# Patient Record
Sex: FEMALE | URBAN - METROPOLITAN AREA
[De-identification: names, ages, dates, MRNs, and addresses within clinical notes are randomized per-mention and may not be internally consistent; named-entity substitution may affect disease eponyms.]

---

## 2018-02-07 ENCOUNTER — APPOINTMENT (RX ONLY)
Dept: URBAN - METROPOLITAN AREA CLINIC 4 | Facility: CLINIC | Age: 43
Setting detail: DERMATOLOGY
End: 2018-02-07

## 2018-02-07 DIAGNOSIS — L82.1 OTHER SEBORRHEIC KERATOSIS: ICD-10-CM

## 2018-02-07 DIAGNOSIS — L40.0 PSORIASIS VULGARIS: ICD-10-CM

## 2018-02-07 DIAGNOSIS — D18.0 HEMANGIOMA: ICD-10-CM

## 2018-02-07 DIAGNOSIS — L81.4 OTHER MELANIN HYPERPIGMENTATION: ICD-10-CM

## 2018-02-07 DIAGNOSIS — D22 MELANOCYTIC NEVI: ICD-10-CM

## 2018-02-07 DIAGNOSIS — Z71.89 OTHER SPECIFIED COUNSELING: ICD-10-CM

## 2018-02-07 PROBLEM — D18.01 HEMANGIOMA OF SKIN AND SUBCUTANEOUS TISSUE: Status: ACTIVE | Noted: 2018-02-07

## 2018-02-07 PROBLEM — I10 ESSENTIAL (PRIMARY) HYPERTENSION: Status: ACTIVE | Noted: 2018-02-07

## 2018-02-07 PROBLEM — D22.5 MELANOCYTIC NEVI OF TRUNK: Status: ACTIVE | Noted: 2018-02-07

## 2018-02-07 PROBLEM — D48.5 NEOPLASM OF UNCERTAIN BEHAVIOR OF SKIN: Status: ACTIVE | Noted: 2018-02-07

## 2018-02-07 PROBLEM — J45.909 UNSPECIFIED ASTHMA, UNCOMPLICATED: Status: ACTIVE | Noted: 2018-02-07

## 2018-02-07 PROCEDURE — ? BIOPSY BY SHAVE METHOD

## 2018-02-07 PROCEDURE — 11100: CPT

## 2018-02-07 PROCEDURE — 99203 OFFICE O/P NEW LOW 30 MIN: CPT | Mod: 25

## 2018-02-07 PROCEDURE — ? PRESCRIPTION

## 2018-02-07 PROCEDURE — 11101: CPT

## 2018-02-07 PROCEDURE — ? COUNSELING

## 2018-02-07 RX ORDER — FLUOCINONIDE 0.5 MG/ML
SOLUTION TOPICAL BID
Qty: 1 | Refills: 2 | Status: ERX | COMMUNITY
Start: 2018-02-07

## 2018-02-07 RX ADMIN — FLUOCINONIDE: 0.5 SOLUTION TOPICAL at 21:16

## 2018-02-07 ASSESSMENT — LOCATION SIMPLE DESCRIPTION DERM
LOCATION SIMPLE: LEFT CALF
LOCATION SIMPLE: LEFT UPPER BACK
LOCATION SIMPLE: RIGHT POPLITEAL SKIN
LOCATION SIMPLE: RIGHT POSTERIOR THIGH
LOCATION SIMPLE: RIGHT FOREARM
LOCATION SIMPLE: RIGHT EAR
LOCATION SIMPLE: CHEST
LOCATION SIMPLE: RIGHT FOREHEAD
LOCATION SIMPLE: RIGHT SCALP
LOCATION SIMPLE: LEFT LOWER BACK
LOCATION SIMPLE: LEFT FOREARM
LOCATION SIMPLE: LOWER BACK
LOCATION SIMPLE: LEFT EAR
LOCATION SIMPLE: ABDOMEN
LOCATION SIMPLE: LEFT POSTERIOR THIGH

## 2018-02-07 ASSESSMENT — LOCATION ZONE DERM
LOCATION ZONE: SCALP
LOCATION ZONE: LEG
LOCATION ZONE: FACE
LOCATION ZONE: EAR
LOCATION ZONE: TRUNK
LOCATION ZONE: ARM

## 2018-02-07 ASSESSMENT — LOCATION DETAILED DESCRIPTION DERM
LOCATION DETAILED: SUBXIPHOID
LOCATION DETAILED: SUPERIOR LUMBAR SPINE
LOCATION DETAILED: RIGHT DISTAL POSTERIOR THIGH
LOCATION DETAILED: PERIUMBILICAL SKIN
LOCATION DETAILED: EPIGASTRIC SKIN
LOCATION DETAILED: LEFT PROXIMAL CALF
LOCATION DETAILED: RIGHT INFERIOR MEDIAL FOREHEAD
LOCATION DETAILED: LEFT MEDIAL UPPER BACK
LOCATION DETAILED: RIGHT CAVUM CONCHA
LOCATION DETAILED: LEFT INFERIOR MEDIAL UPPER BACK
LOCATION DETAILED: MIDDLE STERNUM
LOCATION DETAILED: LEFT DISTAL POSTERIOR THIGH
LOCATION DETAILED: LEFT PROXIMAL DORSAL FOREARM
LOCATION DETAILED: LEFT SUPERIOR MEDIAL UPPER BACK
LOCATION DETAILED: RIGHT POPLITEAL SKIN
LOCATION DETAILED: LEFT INFERIOR LATERAL LOWER BACK
LOCATION DETAILED: RIGHT MEDIAL FRONTAL SCALP
LOCATION DETAILED: RIGHT PROXIMAL DORSAL FOREARM
LOCATION DETAILED: LEFT CAVUM CONCHA

## 2018-02-07 NOTE — HPI: RASH (PSORIASIS)
Do You Have A Family History Of Psoriasis?: no
How Severe Is Your Psoriasis?: mild
Is This A New Presentation, Or A Follow-Up?: Follow Up Psoriasis
Additional History: Needs a refill of fluocinonide solution.

## 2018-02-07 NOTE — PROCEDURE: BIOPSY BY SHAVE METHOD
X Size Of Lesion In Cm: 0
Anesthesia Type: 1% lidocaine with 1:100,000 epinephrine and 408mcg clindamycin/ml and a 1:10 solution of 8.4% sodium bicarbonate
Anesthesia Volume In Cc: 1
Type Of Destruction Used: Curettage
Billing Type: Third-Party Bill
Bill For Surgical Tray: no
Detail Level: Detailed
Lab: [id number]
Dressing: bandage
Consent: Written consent was obtained and risks were reviewed including but not limited to scarring, infection, bleeding, scabbing, incomplete removal, nerve damage and allergy to anesthesia.
Biopsy Method: Personna blade
Electrodesiccation And Curettage Text: The wound bed was treated with electrodesiccation and curettage after the biopsy was performed.
Wound Care: Vaseline
Electrodesiccation Text: The wound bed was treated with electrodesiccation after the biopsy was performed.
Render Post-Care Instructions In Note?: yes
Notification Instructions: Patient will be notified of biopsy results. However, patient instructed to call the office if not contacted within 2 weeks.
Biopsy Type: H and E
Hemostasis: Drysol
Cryotherapy Text: The wound bed was treated with cryotherapy after the biopsy was performed.
Silver Nitrate Text: The wound bed was treated with silver nitrate after the biopsy was performed.
Curettage Text: The wound bed was treated with curettage after the biopsy was performed.
Lab Facility: 
Post-Care Instructions: I reviewed with the patient in detail post-care instructions. Patient is to keep the biopsy site dry overnight, and then apply bacitracin twice daily until healed. Patient may apply hydrogen peroxide soaks to remove any crusting.

## 2018-05-23 ENCOUNTER — OFFICE VISIT (OUTPATIENT)
Dept: MEDICAL GROUP | Facility: MEDICAL CENTER | Age: 43
End: 2018-05-23
Payer: COMMERCIAL

## 2018-05-23 VITALS
HEIGHT: 65 IN | HEART RATE: 77 BPM | WEIGHT: 192.9 LBS | OXYGEN SATURATION: 94 % | DIASTOLIC BLOOD PRESSURE: 86 MMHG | BODY MASS INDEX: 32.14 KG/M2 | TEMPERATURE: 97.7 F | SYSTOLIC BLOOD PRESSURE: 138 MMHG

## 2018-05-23 DIAGNOSIS — J45.50 SEVERE PERSISTENT ASTHMA WITHOUT COMPLICATION: ICD-10-CM

## 2018-05-23 DIAGNOSIS — R03.0 ELEVATED BLOOD PRESSURE READING: ICD-10-CM

## 2018-05-23 DIAGNOSIS — T78.40XA ALLERGIC STATE, INITIAL ENCOUNTER: ICD-10-CM

## 2018-05-23 DIAGNOSIS — L40.9 PSORIASIS: ICD-10-CM

## 2018-05-23 DIAGNOSIS — Z00.00 PREVENTATIVE HEALTH CARE: ICD-10-CM

## 2018-05-23 PROCEDURE — 99204 OFFICE O/P NEW MOD 45 MIN: CPT | Performed by: PHYSICIAN ASSISTANT

## 2018-05-23 RX ORDER — ALBUTEROL SULFATE 90 UG/1
2 AEROSOL, METERED RESPIRATORY (INHALATION) EVERY 6 HOURS PRN
Qty: 3 INHALER | Refills: 3 | Status: SHIPPED | OUTPATIENT
Start: 2018-05-23 | End: 2019-06-03 | Stop reason: SDUPTHER

## 2018-05-23 RX ORDER — CETIRIZINE HYDROCHLORIDE 10 MG/1
10 TABLET ORAL DAILY
COMMUNITY

## 2018-05-23 RX ORDER — FLUOCINONIDE TOPICAL SOLUTION USP, 0.05% 0.5 MG/ML
SOLUTION TOPICAL 2 TIMES DAILY
COMMUNITY
End: 2018-05-23 | Stop reason: SDUPTHER

## 2018-05-23 RX ORDER — MONTELUKAST SODIUM 10 MG/1
10 TABLET ORAL DAILY
Qty: 90 TAB | Refills: 3 | Status: SHIPPED | OUTPATIENT
Start: 2018-05-23 | End: 2019-06-04 | Stop reason: SDUPTHER

## 2018-05-23 RX ORDER — FLUOCINONIDE TOPICAL SOLUTION USP, 0.05% 0.5 MG/ML
SOLUTION TOPICAL
Qty: 1 BOTTLE | Refills: 11 | Status: SHIPPED | OUTPATIENT
Start: 2018-05-23 | End: 2018-05-29 | Stop reason: SDUPTHER

## 2018-05-23 RX ORDER — ALBUTEROL SULFATE 90 UG/1
2 AEROSOL, METERED RESPIRATORY (INHALATION) EVERY 6 HOURS PRN
COMMUNITY
End: 2018-05-23 | Stop reason: SDUPTHER

## 2018-05-23 RX ORDER — TRIAMCINOLONE ACETONIDE 40 MG/ML
60 INJECTION, SUSPENSION INTRA-ARTICULAR; INTRAMUSCULAR ONCE
Status: COMPLETED | OUTPATIENT
Start: 2018-05-23 | End: 2018-05-23

## 2018-05-23 RX ADMIN — TRIAMCINOLONE ACETONIDE 60 MG: 40 INJECTION, SUSPENSION INTRA-ARTICULAR; INTRAMUSCULAR at 14:10

## 2018-05-23 ASSESSMENT — PATIENT HEALTH QUESTIONNAIRE - PHQ9: CLINICAL INTERPRETATION OF PHQ2 SCORE: 0

## 2018-05-23 NOTE — PROGRESS NOTES
Chief Complaint   Patient presents with   • Establish Care     General Check up   • Hypertension     Diagnosed with HTN   • Medication Refill     Ventolin, advair 550, psoriasis       Marcela Mercer is a 43 y.o. new female here for establishing care. Pt is a . States she has gained a lot of weight recently and she has been stressed on nonwork. She has been to take the next couple months off and go traveling across the [place]    HPI:    Severe persistent asthma without complication  Pt has asthma which is a new problem to me.Not controlled on current medicine she is on Advair daily and albuterol as needed. She uses albuterol 4 time a day and multiple times at night. Pt gets wheezing and chest tightness and cough which improves after inhaler.     Allergies  Patient has allergies that recently has been worsening change in season. She is taking Zyrtec daily without any help. Continues to have runny nose and that her symptoms. Patient used to get Gyne-Lotrimin in the past frequently.     Elevated blood pressure reading  Recently patient had one episode of elevated blood pressure reading at her GYN office. States her blood pressure was 134/101 without any SOB or CP or lower leg edema. Patient was advised to follow-up with her PCP.    Patient has psoriasis mainly affects her ears. She is due as Lidex in the past with good results.        Current medicines (including changes today)  Current Outpatient Prescriptions   Medication Sig Dispense Refill   • cetirizine (ZYRTEC) 10 MG Tab Take 10 mg by mouth every day.     • montelukast (SINGULAIR) 10 MG Tab Take 1 Tab by mouth every day. 90 Tab 3   • fluticasone-salmeterol (ADVAIR) 500-50 MCG/DOSE AEROSOL POWDER, BREATH ACTIVATED Inhale 1 Puff by mouth every 12 hours. 3 Inhaler 3   • albuterol 108 (90 Base) MCG/ACT Aero Soln inhalation aerosol Inhale 2 Puffs by mouth every 6 hours as needed for Shortness of Breath. 3 Inhaler 3   • fluocinonide (LIDEX) 0.05 % external  "solution Apply a pea size over affected area 1 Bottle 11     No current facility-administered medications for this visit.      She  has a past medical history of Allergy; Hypertension; and Psoriasis.  She  has a past surgical history that includes sinuscopy.  Social History   Substance Use Topics   • Smoking status: Never Smoker   • Smokeless tobacco: Never Used   • Alcohol use Yes      Comment: occ     Social History     Social History Narrative   • No narrative on file     History reviewed. No pertinent family history.  Family Status   Relation Status   • Mother Alive   • Father Other   • Sister Alive   • Brother Alive       Past medical, surgical, family, and social history is reviewed in [facility] chart by me today.   Medications and allergies reviewed in Epic chart by me today.       ROS  General:  No fevers or chills, no night sweats or fatigue.   Eyes: no change in vision.   ENT:         Ears: No drainage. No change in hearing      Nose :No epitaxis        Mouth/ throat: No lesions. No sore throat  GI: no nausea, no vomiting, no diarrhea or constipations. Bowel regular and normal. No melena or hematochezia. No hematemesis  : no Frequency, no urgency, no dysuria, no hematuria.   MS:  Negative for muscle pain or weakness.  Skin: no rashes or abnormal lesions.   Neuro: No seizures, no tingling or numbness.   Endo: no polyuria, no polydypsia, no cold intolerance, no heat intolerance  Psych: no depression, no anxiety, no Drug/Alcohol abuse  Hem: no easy bruising.    As documented in history of present illness  ROS neg:  All other review of systems were reviewed and negative.             Objective:     Blood pressure 138/86, pulse 77, temperature 36.5 °C (97.7 °F), height 1.651 m (5' 5\"), weight 87.5 kg (192 lb 14.4 oz), last menstrual period 05/10/2018, SpO2 94 %, not currently breastfeeding. Body mass index is 32.1 kg/m².  Physical Exam:    Constitutional: Well-developed and well-nourished. No distress.   Skin: Skin " is warm and dry. No rashes in visible areas.  Nail: w/o pitting, ridging or onychomycosis   Head: Atraumatic without lesions.  Eyes: Equal, round and reactive, conjunctiva clear,sclera non icteric, lids normal.  Ears:  External ears unremarkable. Tympanic membranes clear and intact.  Nose: Nares patent. Septum midline. Turbinates without erythema nor edema. No discharge.    Mouth/Throat: Dentition is good. Tongue normal. Oropharynx is clear and moist. Posterior pharynx without erythema or exudates.  Neck: Supple, trachea midline.  No thyromegaly present. No lymphadenopathy--cervical or supraclavicular  Cardiovascular: Regular rate and rhythm, without any murmurs.  No lower extremity edema. Cap refill < 3sec  Lung:  Clear to auscultation throughout w/o any wheeze or rhonchi. No adventitious sounds.    Abdomen: Soft, non tender, and without distention. Active bowel sounds in all four quadrants. No rebound, guarding, masses or HSM. No CVA tenderness  Musculoskeletal: All major joints without pain or swelling  Neurological: speech normal, mental status intact, gait grossly normal  Psychiatric:   Alert and oriented x3, normal affect and mood and behavior     Assessment and Plan:   The following treatment plan was discussed    1. Severe persistent asthma without complication    - montelukast (SINGULAIR) 10 MG Tab; Take 1 Tab by mouth every day.  Dispense: 90 Tab; Refill: 3  - fluticasone-salmeterol (ADVAIR) 500-50 MCG/DOSE AEROSOL POWDER, BREATH ACTIVATED; Inhale 1 Puff by mouth every 12 hours.  Dispense: 3 Inhaler; Refill: 3  - albuterol 108 (90 Base) MCG/ACT Aero Soln inhalation aerosol; Inhale 2 Puffs by mouth every 6 hours as needed for Shortness of Breath.  Dispense: 3 Inhaler; Refill: 3    2. Elevated blood pressure reading  Today in our office her BP is 138/86    3. Preventative health care    - COMP METABOLIC PANEL; Future  - CBC WITH DIFFERENTIAL; Future  - LIPID PROFILE; Future  - TSH WITH REFLEX TO FT4;  Future  - VITAMIN D,25 HYDROXY; Future    4. BMI 32.0-32.9,adult  - Patient identified as having weight management issue.  Appropriate orders and counseling given.      5. Psoriasis    - fluocinonide (LIDEX) 0.05 % external solution; Apply a pea size over affected area  Dispense: 1 Bottle; Refill: 11    6. Allergic state, initial encounter    - triamcinolone acetonide (KENALOG-40) injection 60 mg; 1.5 mL by Intramuscular route Once.    Pap up to date, gets it at gyn office      Followup: Return in about 2 months (around 7/23/2018).         Please note that this dictation was created using voice recognition software. I have made every reasonable attempt to correct obvious errors, but I expect that there are errors of grammar and possibly content that I did not discover before finalizing the note

## 2018-05-23 NOTE — ASSESSMENT & PLAN NOTE
Uses albuterol 4 time a day and multiple times at night. Pt gets wheezing and chest tightness and cough which improves after inhaler.

## 2018-05-23 NOTE — LETTER
Critical access hospital  Tiffany Robles P.A.-C.  [address]  [place] [address]  Fax: [phone]   Authorization for Release/Disclosure of   Protected Health Information   Name: MARCELA BELLO : 1975 SSN: [id number]   Address: [address] [place] [address] [address]  [place] [address] Phone:    [phone] (home)    I authorize the entity listed below to release/disclose the PHI below to:   Critical access hospital/Tiffany Robles P.A.-C. and Tiffany Robles P.A.-C.   Provider or Entity Name: HARDY Zamudio     Address   City, [facility]   Phone:      Fax:: [phone]     Reason for request: continuity of care   Information to be released:    [  ] LAST COLONOSCOPY,  including any PATH REPORT and follow-up  [  ] LAST FIT/COLOGUARD RESULT [  ] LAST DEXA  [ xxx ] LAST MAMMOGRAM  [ xxx ] LAST PAP  [  ] LAST LABS [  ] RETINA EXAM REPORT  [  ] IMMUNIZATION RECORDS  [  ] Release all info      [  ] Check here and initial the line next to each item to release ALL health information INCLUDING  _____ Care and treatment for drug and / or alcohol abuse  _____ HIV testing, infection status, or AIDS  _____ Genetic Testing    DATES OF SERVICE OR TIME PERIOD TO BE DISCLOSED: _____________  I understand and acknowledge that:  * This Authorization may be revoked at any time by you in writing, except if your health information has already been used or disclosed.  * Your health information that will be used or disclosed as a result of you signing this authorization could be re-disclosed by the recipient. If this occurs, your re-disclosed health information may no longer be protected by State or Federal laws.  * You may refuse to sign this Authorization. Your refusal will not affect your ability to obtain treatment.  * This Authorization becomes effective upon signing and will  on (date) __________.      If no date is indicated, this Authorization will  one (1) year from the signature date.    Name: Marcela LANDRY  Money    Signature:   Date:     5/23/2018       PLEASE FAX REQUESTED RECORDS BACK TO: [phone]

## 2018-05-25 ENCOUNTER — HOSPITAL ENCOUNTER (EMERGENCY)
Facility: MEDICAL CENTER | Age: 43
End: 2018-05-25
Payer: COMMERCIAL

## 2018-05-25 VITALS
BODY MASS INDEX: 32.54 KG/M2 | RESPIRATION RATE: 18 BRPM | HEART RATE: 108 BPM | SYSTOLIC BLOOD PRESSURE: 134 MMHG | TEMPERATURE: 97.1 F | OXYGEN SATURATION: 94 % | DIASTOLIC BLOOD PRESSURE: 95 MMHG | WEIGHT: 195.55 LBS

## 2018-05-25 LAB — GLUCOSE BLD-MCNC: 91 MG/DL (ref 65–99)

## 2018-05-25 PROCEDURE — 82962 GLUCOSE BLOOD TEST: CPT

## 2018-05-25 PROCEDURE — 302449 STATCHG TRIAGE ONLY (STATISTIC)

## 2018-05-26 NOTE — ED TRIAGE NOTES
"Marcela LANDRY Money  43 y.o. female  Chief Complaint   Patient presents with   • Unresponsive     Pt's family reports, \"She was unresponsive at home. The ambulance came. She started to come around when they came.\"        Pt triage via wheelchair for above complaint. Pt up to scale with steady gait. Urine noted in wheelchair. Pt reports having an IUD placed earlier today.   FSBS 91 and POC breathalyzer 0.283 in triage.  Pt is alert and oriented, speaking in full sentences with slurred speech, follows commands and responds appropriately to questions after some prompts. NAD. Resp are even and unlabored.  Pt placed in lobby. Pt educated on triage process. Pt encouraged to alert staff for any changes.    "

## 2018-05-29 DIAGNOSIS — L40.9 PSORIASIS: ICD-10-CM

## 2018-05-29 RX ORDER — FLUOCINONIDE TOPICAL SOLUTION USP, 0.05% 0.5 MG/ML
SOLUTION TOPICAL
Qty: 1 BOTTLE | Refills: 11 | Status: SHIPPED | OUTPATIENT
Start: 2018-05-29 | End: 2020-10-27 | Stop reason: SDUPTHER

## 2019-03-19 ENCOUNTER — OFFICE VISIT (OUTPATIENT)
Dept: MEDICAL GROUP | Facility: MEDICAL CENTER | Age: 44
End: 2019-03-19
Payer: COMMERCIAL

## 2019-03-19 VITALS
OXYGEN SATURATION: 97 % | TEMPERATURE: 98 F | HEIGHT: 65 IN | SYSTOLIC BLOOD PRESSURE: 132 MMHG | DIASTOLIC BLOOD PRESSURE: 82 MMHG | HEART RATE: 118 BPM | BODY MASS INDEX: 32.65 KG/M2 | RESPIRATION RATE: 20 BRPM | WEIGHT: 195.99 LBS

## 2019-03-19 DIAGNOSIS — J45.40 MODERATE PERSISTENT ASTHMA WITHOUT COMPLICATION: ICD-10-CM

## 2019-03-19 PROCEDURE — 99214 OFFICE O/P EST MOD 30 MIN: CPT | Performed by: PHYSICIAN ASSISTANT

## 2019-03-19 RX ORDER — PREDNISONE 20 MG/1
TABLET ORAL
Qty: 11 TAB | Refills: 0 | Status: SHIPPED | OUTPATIENT
Start: 2019-03-19 | End: 2021-03-30

## 2019-03-19 ASSESSMENT — PATIENT HEALTH QUESTIONNAIRE - PHQ9: CLINICAL INTERPRETATION OF PHQ2 SCORE: 0

## 2019-03-19 NOTE — PROGRESS NOTES
"Chief Complaint   Patient presents with   • Cough   • Headache       HPI:  Marcela Mercer is a 44 y.o. female here for new onset cough and headache X 1 month.  Patient states that symptoms started with sore throat and nasal congestion about a month ago.  Sore throat has resolved however she continues to have a dry cough and sensation that she cannot have a deep breathe in with chest tightness.  Patient is asthmatic and takes Advair and montelukast daily.  She takes albuterol as needed however states in the past month she has been using her albuterol multiple times a day.  Albuterol makes her heart rate go up.  Patient has 2 cats at home which she is allergic to.  States she has allergies to pollens and many other things.        Past medical, surgical, family, and social history is reviewed and updated in Epic chart by me today.   Medications and allergies reviewed and updated in Epic chart by me today.       ROS:   As documented in history of present illness above      Exam:  Blood pressure 132/82, pulse (!) 118, temperature 36.7 °C (98 °F), temperature source Temporal, resp. rate 20, height 1.651 m (5' 5\"), weight 88.9 kg (195 lb 15.8 oz), last menstrual period 03/05/2019, SpO2 97 %, not currently breastfeeding.    Constitutional: Alert, no distress.  Skin: Warm, dry, no rashes in visible areas.  Eye:conjunctiva clear without injection, no discharge,  lids normal.  ENMT: Lips without lesions, oropharynx clear, no tonsilar exudates.   Neck: Trachea midline, no masses, no thyromegaly. No cervical or supraclavicular lymphadenopathy  Respiratory: Unlabored respiratory effort, lungs clear to auscultation, no wheezes, no ronchi.  Cardiovascular: Normal S1, S2, no murmur,   Psych: Alert and oriented x3, normal affect and mood.            A/P:      1. Moderate persistent asthma without complication  predniSONE (DELTASONE) 20 MG Tab     O2 sat of 97.  Patient is tachycardic, could be due to using albuterol.  Therefore " decided not to do DuoNeb treatment today in office.  Discussed with patient that she needs to avoid allergens including the cats if it is making her asthma worse.  I suspect this is an asthma exacerbation since patient is    Advised patient to follow-up in 7 days if symptoms are not improving.    No Follow-up on file.

## 2019-06-03 DIAGNOSIS — J45.50 SEVERE PERSISTENT ASTHMA WITHOUT COMPLICATION: ICD-10-CM

## 2019-06-04 DIAGNOSIS — J45.50 SEVERE PERSISTENT ASTHMA WITHOUT COMPLICATION: ICD-10-CM

## 2019-06-04 RX ORDER — ALBUTEROL SULFATE 90 UG/1
2 AEROSOL, METERED RESPIRATORY (INHALATION) EVERY 6 HOURS PRN
Qty: 54 G | Refills: 0 | Status: SHIPPED | OUTPATIENT
Start: 2019-06-04 | End: 2019-06-25 | Stop reason: SDUPTHER

## 2019-06-05 RX ORDER — MONTELUKAST SODIUM 10 MG/1
10 TABLET ORAL DAILY
Qty: 90 TAB | Refills: 3 | Status: SHIPPED | OUTPATIENT
Start: 2019-06-05 | End: 2019-07-15 | Stop reason: SDUPTHER

## 2019-06-24 ENCOUNTER — TELEPHONE (OUTPATIENT)
Dept: MEDICAL GROUP | Facility: MEDICAL CENTER | Age: 44
End: 2019-06-24

## 2019-06-24 NOTE — TELEPHONE ENCOUNTER
Pt. Called and left a message stating she tried to use the albuterol inhaler last night and she noticed it wasn't working. It turns out she noticed it wasn't producing anything.She then used an old inhaler she has used before that still has a little bit left in it. She spoke with the pharmacy and they will replace it. She wants to know if it is possible to prescribe a back up inhaler in case this happens again.     She states she hikes a lot and is worried about that. She had an asthma attack last week while hiking. She wants to know if she can get a back up inhaler incase something like this happens again.

## 2019-06-25 DIAGNOSIS — J45.50 SEVERE PERSISTENT ASTHMA WITHOUT COMPLICATION: ICD-10-CM

## 2019-06-25 RX ORDER — ALBUTEROL SULFATE 90 UG/1
2 AEROSOL, METERED RESPIRATORY (INHALATION) EVERY 6 HOURS PRN
Qty: 54 G | Refills: 1 | Status: SHIPPED | OUTPATIENT
Start: 2019-06-25 | End: 2019-07-15 | Stop reason: SDUPTHER

## 2019-06-26 NOTE — TELEPHONE ENCOUNTER
Phone Number Called: [phone]     Call outcome: left message for patient to call back regarding message below    Message: LVM for pt. Stating that Tiffany filled her prescription for another inhaler to her local pharmacy.number we have on file for pt. Is not current, tried calling it however it was not in service. Received this number from the pharmacy.

## 2019-07-15 DIAGNOSIS — J45.50 SEVERE PERSISTENT ASTHMA WITHOUT COMPLICATION: ICD-10-CM

## 2019-07-16 RX ORDER — ALBUTEROL SULFATE 90 UG/1
2 AEROSOL, METERED RESPIRATORY (INHALATION) EVERY 6 HOURS PRN
Qty: 54 G | Refills: 1 | Status: SHIPPED | OUTPATIENT
Start: 2019-07-16 | End: 2019-11-19 | Stop reason: SDUPTHER

## 2019-07-16 RX ORDER — MONTELUKAST SODIUM 10 MG/1
10 TABLET ORAL DAILY
Qty: 90 TAB | Refills: 3 | Status: SHIPPED | OUTPATIENT
Start: 2019-07-16 | End: 2019-11-19 | Stop reason: SDUPTHER

## 2019-09-12 DIAGNOSIS — J45.50 SEVERE PERSISTENT ASTHMA WITHOUT COMPLICATION: ICD-10-CM

## 2019-09-17 ENCOUNTER — OFFICE VISIT (OUTPATIENT)
Dept: MEDICAL GROUP | Facility: MEDICAL CENTER | Age: 44
End: 2019-09-17
Payer: COMMERCIAL

## 2019-09-17 VITALS
TEMPERATURE: 97.2 F | HEIGHT: 65 IN | RESPIRATION RATE: 20 BRPM | HEART RATE: 100 BPM | SYSTOLIC BLOOD PRESSURE: 160 MMHG | OXYGEN SATURATION: 97 % | DIASTOLIC BLOOD PRESSURE: 100 MMHG | WEIGHT: 189.38 LBS | BODY MASS INDEX: 31.55 KG/M2

## 2019-09-17 DIAGNOSIS — Z98.890 HISTORY OF SINUS SURGERY: ICD-10-CM

## 2019-09-17 DIAGNOSIS — L40.9 PSORIASIS: ICD-10-CM

## 2019-09-17 DIAGNOSIS — I10 ESSENTIAL HYPERTENSION: ICD-10-CM

## 2019-09-17 DIAGNOSIS — J45.50 SEVERE PERSISTENT ASTHMA WITHOUT COMPLICATION: ICD-10-CM

## 2019-09-17 DIAGNOSIS — Z00.00 PREVENTATIVE HEALTH CARE: ICD-10-CM

## 2019-09-17 PROCEDURE — 99214 OFFICE O/P EST MOD 30 MIN: CPT | Performed by: PHYSICIAN ASSISTANT

## 2019-09-17 RX ORDER — LOSARTAN POTASSIUM 25 MG/1
25 TABLET ORAL DAILY
Qty: 30 TAB | Refills: 1 | Status: SHIPPED | OUTPATIENT
Start: 2019-09-17 | End: 2019-10-09 | Stop reason: SDUPTHER

## 2019-09-17 RX ORDER — BETAMETHASONE DIPROPIONATE 0.05 %
OINTMENT (GRAM) TOPICAL
Qty: 1 TUBE | Refills: 5 | Status: SHIPPED | OUTPATIENT
Start: 2019-09-17

## 2019-09-17 SDOH — HEALTH STABILITY: MENTAL HEALTH: HOW OFTEN DO YOU HAVE A DRINK CONTAINING ALCOHOL?: MONTHLY OR LESS

## 2019-09-17 SDOH — HEALTH STABILITY: MENTAL HEALTH: HOW OFTEN DO YOU HAVE 6 OR MORE DRINKS ON ONE OCCASION?: NEVER

## 2019-09-17 SDOH — HEALTH STABILITY: MENTAL HEALTH: HOW MANY STANDARD DRINKS CONTAINING ALCOHOL DO YOU HAVE ON A TYPICAL DAY?: 1 OR 2

## 2019-09-17 NOTE — PROGRESS NOTES
"Chief Complaint   Patient presents with   • Asthma   • Other     patient wants to talk about health       HPI  Marcela Mercer is a 44 y.o. female here today for    Patient has asthma and is currently taking daily montelukast and Advair however still has wheezing and nighttime symptoms and cough.  States she uses her albuterol multiple times a day.  Patient has chronic sinus congestion with prior sinus surgery.  States she has had sinus polyps removed.  Patient has allergies and has been to allergies in the past.  She has tried allergy shots without any help.    Patient has elevated BP today in office 160/100.  Has no prior history of hypertension per..  States she has been very stressed out because of her daughter causing problems.  Denies any S OB or CP.  States when her BP is elevated she feels a tension kind of feeling in her head.  Does not have blood pressure cough but she checks it at the grocery store and it has been elevated 160/170, over 90 -100.    Patient has psoriasis and has tried Lidex that used to be helpful.  States recently she started getting more patches over lower extremity and abdomen.  Patches are small.      Past medical, surgical, family, and social history is reviewed in Epic chart by me today.   Medications and allergies reviewed and updated in Epic chart by me today.     ROS:   As documented in history of present illness above    Exam:  /100   Pulse 100   Temp 36.2 °C (97.2 °F) (Temporal)   Resp 20   Ht 1.651 m (5' 5\")   Wt 85.9 kg (189 lb 6 oz)   SpO2 97%   Constitutional: Alert, no distress, plus 3 vital signs  Skin: Multiple small scaly erythematous area over lower extremity   eye: Equal, round and reactive, conjunctiva clear  ENMT: Lips without lesions, good dentition, oropharynx clear    Neck: Trachea midline, no masses, no thyromegaly  Respiratory: Unlabored respiratory effort, lungs clear to auscultation, no wheezes, no rhonchi  Cardiovascular: RRR, no murmur, no lower " extremities edema,   Psych: Alert, pleasant, well-groomed, normal affect    A/P:  1. Essential hypertension  Starting patient on low-dose losartan for elevated /100.  Patient is 89 medicine therefore was started on a low dose.  - losartan (COZAAR) 25 MG Tab; Take 1 Tab by mouth every day.  Dispense: 30 Tab; Refill: 1    2. Severe persistent asthma without complication  Asthma symptoms not controlled, patient uses albuterol multiple times a day.  Uses montelukast and Advair daily.  Has nighttime symptoms including wheezing and cough almost every night.  - REFERRAL TO PULMONOLOGY    3. History of sinus surgery      4. Psoriasis  Declines dermatology referral, denies any joint pains.    - betamethasone dipropionate (DIPROLENE) 0.05 % Ointment; apply a pea size over affected area once daily as needed  Dispense: 1 Tube; Refill: 5    5. Preventative health care    - CBC WITH DIFFERENTIAL; Future  - Comp Metabolic Panel; Future  - Lipid Profile; Future  - TSH WITH REFLEX TO FT4; Future  - VITAMIN D,25 HYDROXY; Future      F/u in 2-3 wks for HTN, Lab results and psoriasis

## 2019-10-04 DIAGNOSIS — I10 ESSENTIAL HYPERTENSION: ICD-10-CM

## 2019-10-04 RX ORDER — LOSARTAN POTASSIUM 25 MG/1
25 TABLET ORAL DAILY
Qty: 30 TAB | Refills: 1 | OUTPATIENT
Start: 2019-10-04

## 2019-10-04 NOTE — TELEPHONE ENCOUNTER
Patient states she wasn't feeling better with just the 1 pill. Patient started to take 1 1/2 daily and is now out can we please change to 40 milligrams          Was the patient seen in the last year in this department? Yes    Does patient have an active prescription for medications requested? No     Received Request Via: Pharmacy

## 2019-10-08 ENCOUNTER — APPOINTMENT (OUTPATIENT)
Dept: MEDICAL GROUP | Facility: MEDICAL CENTER | Age: 44
End: 2019-10-08
Payer: COMMERCIAL

## 2019-10-08 DIAGNOSIS — I10 ESSENTIAL HYPERTENSION: ICD-10-CM

## 2019-10-08 RX ORDER — LOSARTAN POTASSIUM 25 MG/1
25 TABLET ORAL DAILY
Qty: 30 TAB | Refills: 1 | Status: CANCELLED | OUTPATIENT
Start: 2019-10-08

## 2019-10-08 NOTE — TELEPHONE ENCOUNTER
Patient had appointment today 10/08 but had to be rescheduled due to provider leaving. She states she is out of meds.    Was the patient seen in the last year in this department? Yes    Does patient have an active prescription for medications requested? No     Received Request Via: Patient

## 2019-10-09 RX ORDER — LOSARTAN POTASSIUM 25 MG/1
37.5 TABLET ORAL DAILY
Qty: 30 TAB | Refills: 1 | Status: SHIPPED | OUTPATIENT
Start: 2019-10-09 | End: 2021-10-12

## 2019-10-09 NOTE — TELEPHONE ENCOUNTER
Patient was supposed to see Tiffany yesterday. We had to reschedule do to an emergency. She needs to up her dose that she has been taking which is 1 1/2 pills a day. Patient feels better. She does have an appointment next week. Can you help me ? Please advise

## 2019-10-23 ENCOUNTER — APPOINTMENT (OUTPATIENT)
Dept: MEDICAL GROUP | Facility: MEDICAL CENTER | Age: 44
End: 2019-10-23
Payer: COMMERCIAL

## 2019-11-06 DIAGNOSIS — J45.50 SEVERE PERSISTENT ASTHMA WITHOUT COMPLICATION: ICD-10-CM

## 2019-11-13 ENCOUNTER — APPOINTMENT (OUTPATIENT)
Dept: MEDICAL GROUP | Facility: MEDICAL CENTER | Age: 44
End: 2019-11-13
Payer: COMMERCIAL

## 2019-11-17 DIAGNOSIS — J45.50 SEVERE PERSISTENT ASTHMA WITHOUT COMPLICATION: ICD-10-CM

## 2019-11-19 ENCOUNTER — OFFICE VISIT (OUTPATIENT)
Dept: MEDICAL GROUP | Facility: MEDICAL CENTER | Age: 44
End: 2019-11-19
Payer: COMMERCIAL

## 2019-11-19 VITALS
BODY MASS INDEX: 33.24 KG/M2 | WEIGHT: 199.52 LBS | DIASTOLIC BLOOD PRESSURE: 82 MMHG | SYSTOLIC BLOOD PRESSURE: 128 MMHG | OXYGEN SATURATION: 98 % | HEIGHT: 65 IN | TEMPERATURE: 97.2 F | HEART RATE: 84 BPM | RESPIRATION RATE: 18 BRPM

## 2019-11-19 DIAGNOSIS — J45.50 SEVERE PERSISTENT ASTHMA WITHOUT COMPLICATION: ICD-10-CM

## 2019-11-19 DIAGNOSIS — J39.2 LESION OF PHARYNX: ICD-10-CM

## 2019-11-19 DIAGNOSIS — I10 ESSENTIAL HYPERTENSION: ICD-10-CM

## 2019-11-19 PROCEDURE — 99214 OFFICE O/P EST MOD 30 MIN: CPT | Performed by: PHYSICIAN ASSISTANT

## 2019-11-19 RX ORDER — MONTELUKAST SODIUM 10 MG/1
10 TABLET ORAL DAILY
Qty: 90 TAB | Refills: 3 | Status: SHIPPED | OUTPATIENT
Start: 2019-11-19 | End: 2021-02-17

## 2019-11-19 RX ORDER — LOSARTAN POTASSIUM 50 MG/1
50 TABLET ORAL DAILY
Qty: 90 TAB | Refills: 1 | Status: SHIPPED | OUTPATIENT
Start: 2019-11-19 | End: 2020-03-17 | Stop reason: SDUPTHER

## 2019-11-19 RX ORDER — MONTELUKAST SODIUM 10 MG/1
TABLET ORAL
Qty: 90 TAB | Refills: 0 | Status: SHIPPED | OUTPATIENT
Start: 2019-11-19 | End: 2020-02-25

## 2019-11-19 RX ORDER — ALBUTEROL SULFATE 90 UG/1
2 AEROSOL, METERED RESPIRATORY (INHALATION) EVERY 6 HOURS PRN
Qty: 54 G | Refills: 1 | Status: SHIPPED | OUTPATIENT
Start: 2019-11-19 | End: 2020-01-21

## 2019-11-19 NOTE — PROGRESS NOTES
"Chief Complaint   Patient presents with   • Other     patient wants her BP to be checked   • Other     patient wants to discuss her medications       HPI  Marcela Mercer is a 44 y.o. female here today for:    Patient was a started on losartan 25 mg daily for hypertension.  Did not have any side effects however states 25 was not enough to control her BP, numbers 140/100.  Currently she is taking 1-1/2 pill, good BP, 128/82 today in our office.  Also her heart rate has improved, today in office HR 84.  States she has cut back using albuterol for asthma.  Currently takes daily montelukast and fluticasone salmeterol.  States her symptoms are currently better.    Patient has had a lesion over right side of the uvula for many years.  Her dentist told her that it is gradually increasing in size.  No pain, does not cause dysphasia.    Past medical, surgical, family, and social history is reviewed in Epic chart by me today.   Medications and allergies reviewed and updated in Epic chart by me today.     ROS:   As documented in history of present illness above    Exam:  /82 (BP Location: Right arm, Patient Position: Sitting, BP Cuff Size: Adult)   Pulse 84   Temp 36.2 °C (97.2 °F) (Temporal)   Resp 18   Ht 1.651 m (5' 5\")   Wt 90.5 kg (199 lb 8.3 oz)   SpO2 98%   Constitutional: Alert, no distress, plus 3 vital signs  Eye: Equal, round and reactive, conjunctiva clear  ENMT: Lips without lesions, good dentition, small lesion over right side of the uvula over the pharynx with cauliflower appearance on top.    Neck: Trachea midline, no masses, no thyromegaly  Respiratory: Unlabored respiratory effort, lungs clear to auscultation, no wheezes, no rhonchi  Cardiovascular: RRR, no murmur,      Psych: Alert, pleasant, well-groomed, normal affect    A/P:  1. Essential hypertension  Increase losartan to 50 mg qd   - losartan (COZAAR) 50 MG Tab; Take 1 Tab by mouth every day.  Dispense: 90 Tab; Refill: 1    2. Severe " persistent asthma without complication    - montelukast (SINGULAIR) 10 MG Tab; Take 1 Tab by mouth every day.  Dispense: 90 Tab; Refill: 3  - albuterol 108 (90 Base) MCG/ACT Aero Soln inhalation aerosol; Inhale 2 Puffs by mouth every 6 hours as needed for Shortness of Breath.  Dispense: 54 g; Refill: 1    3. Lesion of pharynx    - REFERRAL TO ENT    F/u prn

## 2019-12-07 DIAGNOSIS — I10 ESSENTIAL HYPERTENSION: ICD-10-CM

## 2019-12-10 RX ORDER — LOSARTAN POTASSIUM 25 MG/1
TABLET ORAL
Qty: 30 TAB | Refills: 0 | Status: SHIPPED | OUTPATIENT
Start: 2019-12-10 | End: 2021-10-12

## 2020-01-20 DIAGNOSIS — J45.50 SEVERE PERSISTENT ASTHMA WITHOUT COMPLICATION: ICD-10-CM

## 2020-01-21 RX ORDER — ALBUTEROL SULFATE 90 UG/1
2 AEROSOL, METERED RESPIRATORY (INHALATION) EVERY 6 HOURS PRN
Qty: 8.5 G | Refills: 1 | Status: SHIPPED | OUTPATIENT
Start: 2020-01-21 | End: 2020-08-11 | Stop reason: SDUPTHER

## 2020-02-24 DIAGNOSIS — J45.50 SEVERE PERSISTENT ASTHMA WITHOUT COMPLICATION: ICD-10-CM

## 2020-02-25 RX ORDER — MONTELUKAST SODIUM 10 MG/1
TABLET ORAL
Qty: 90 TAB | Refills: 0 | Status: SHIPPED | OUTPATIENT
Start: 2020-02-25 | End: 2020-09-17

## 2020-03-17 DIAGNOSIS — I10 ESSENTIAL HYPERTENSION: ICD-10-CM

## 2020-03-17 RX ORDER — LOSARTAN POTASSIUM 50 MG/1
50 TABLET ORAL DAILY
Qty: 90 TAB | Refills: 1 | Status: SHIPPED | OUTPATIENT
Start: 2020-03-17 | End: 2020-11-18 | Stop reason: SDUPTHER

## 2020-03-17 NOTE — TELEPHONE ENCOUNTER
Patient states she has no more refills.     Received request via: Patient    Was the patient seen in the last year in this department? Yes    Does the patient have an active prescription (recently filled or refills available) for medication(s) requested? No

## 2020-04-21 NOTE — TELEPHONE ENCOUNTER
[address] [facility]  Dept: [phone]  Dept Fax: [phone]  Loc: [phone]    Manda Jara is a 76 y.o. female who presents today for her medical conditions/complaints as noted below. Manda Jara is c/o of   Chief Complaint   Patient presents with    Headache    Nausea    Depression       HPI:     HPI Yvette Mcneal was seen today for her regular weekly follow up while at [facility]. Since she was seen two weeks ago due to me being on vacation she was back in the hospital at [place] [address] to have her shunt readjusted. Since having it adjusted again she feels like things are going much better although she still doesn't feel great. Her headaches are gone but she has some back pain, she is tired and she is occasionally nauseated. She has not gotten to do much therapy yet because her neurosurgeon wants her to wait until her shunt is more stable. She is still feeling very depressed. She has no motivation to do anything and she is just generally feeling down. She is not sleeping very well. She is falling asleep more easily with the trazodone but she is still waking up around midnight and struggling to go back to sleep. Her appetite is decreased. She is feeling very irritable and her family has noticed that she is depressed.        Past Medical History:   Diagnosis Date    Chronic obstructive pulmonary disease ([address]     Coronary artery disease     Dyslipidemia     Obesity     Pneumonia           Social History     Tobacco Use    Smoking status: Current Every Day Smoker     Packs/day: 1.00     Years: 50.00     Pack years: 50.00     Start date: 1/1/1970    Smokeless tobacco: Never Used   Substance Use Topics    Alcohol use: No     Current Outpatient Medications   Medication Sig Dispense Refill    sertraline (ZOLOFT) 50 MG tablet Take 1 tablet by mouth daily 30 tablet 0    traZODone (DESYREL) 100 MG tablet Take 1 tablet by mouth Received request via: Pharmacy    Was the patient seen in the last year in this department? Yes    Does the patient have an active prescription (recently filled or refills available) for medication(s) requested? No   nightly 30 tablet 0    Misc. Devices (WALKER) MISC Standard walker with two wheels 1 each 0    butalbital-acetaminophen-caffeine (FIORICET, ESGIC) -40 MG per tablet Take 1 tablet by mouth every 4 hours as needed for Headaches 30 tablet 0    carvedilol (COREG) 3.125 MG tablet Take 1 tablet by mouth 2 times daily (with meals) 60 tablet 3    ondansetron (ZOFRAN ODT) 4 MG disintegrating tablet Take 1 tablet by mouth every 8 hours as needed for Nausea 20 tablet 0    ibuprofen (ADVIL;MOTRIN) 600 MG tablet Take 1 tablet by mouth every 8 hours as needed for Pain 20 tablet 0    aspirin 325 MG tablet Take 325 mg by mouth daily      ondansetron (ZOFRAN) 4 MG tablet Take 1 tablet by mouth every 8 hours as needed for Nausea 20 tablet 0    tiZANidine (ZANAFLEX) 2 MG tablet Take 1 tablet by mouth 3 times daily as needed (muscle spasm or headach) 30 tablet 0    fluticasone (FLONASE) 50 MCG/ACT nasal spray 1 spray by Nasal route daily 1 Bottle 0    Pseudoephedrine HCl (SUDAFED 12 HOUR PO) Take 1 tablet by mouth 2 times daily      albuterol sulfate  (90 Base) MCG/ACT inhaler Inhale 2 puffs into the lungs 4 times daily as needed for Wheezing 1 Inhaler 0    Pseudoephedrine-APAP-DM (DAYQUIL PO) Take by mouth      loratadine (CLARITIN) 10 MG tablet Take 10 mg by mouth daily       No current facility-administered medications for this visit. Allergies   Allergen Reactions    Vicodin [Hydrocodone-Acetaminophen]        Subjective:     Review of Systems   Constitutional: Positive for activity change, appetite change and fatigue. Negative for fever. Eyes: Negative for visual disturbance. Respiratory: Negative for cough, chest tightness, shortness of breath and wheezing. Cardiovascular: Negative for chest pain, palpitations and leg swelling. Gastrointestinal: Positive for nausea. Negative for abdominal pain, constipation and diarrhea.    Skin: Negative for rash and wound (surgical wounds are well healed). Neurological: Negative for dizziness, syncope and headaches (improving). Psychiatric/Behavioral: Positive for decreased concentration, dysphoric mood and sleep disturbance. Negative for hallucinations, self-injury and suicidal ideas. The patient is not nervous/anxious. Objective:      Physical Exam   Constitutional: She is oriented to person, place, and time. She appears well-developed and well-nourished. She appears ill. She appears distressed. She is laying on her side in bed during my exam with ice on her back   Neck: Normal range of motion. Neck supple. Cardiovascular: Normal rate, regular rhythm, normal heart sounds and intact distal pulses. No murmur heard. Pulmonary/Chest: Effort normal and breath sounds normal. No respiratory distress. She has no wheezes. Abdominal: Soft. Bowel sounds are normal. There is no tenderness. There is no rebound. Lymphadenopathy:     She has no cervical adenopathy. Neurological: She is alert and oriented to person, place, and time. Psychiatric: Her speech is normal. Judgment and thought content normal. Her mood appears not anxious. She is withdrawn. Cognition and memory are normal. She exhibits a depressed mood. She expresses no suicidal ideation. She expresses no suicidal plans and no homicidal plans. Nursing note and vitals reviewed. Assessment:       Diagnosis Orders   1. Chiari malformation type I ([address]     2. S/P  shunt     3. Moderate episode of recurrent major depressive disorder ([address]     4. Hydrocephalus, unspecified type               Plan:        Chiari malformation/s/p shunt: improving; she is doing better since her shunt was recently readjusted. Hopefully she can start doing therapy soon. Depression: new; Roscoe Muñiz was started on zoloft. I explained that the main side effect is GI distress. I will start with a low dose and titrate up as needed.  she was instructed that it can take 4-6weeks before the medication is working

## 2020-05-29 DIAGNOSIS — J45.50 SEVERE PERSISTENT ASTHMA WITHOUT COMPLICATION: ICD-10-CM

## 2020-08-11 ENCOUNTER — TELEPHONE (OUTPATIENT)
Dept: MEDICAL GROUP | Facility: PHYSICIAN GROUP | Age: 45
End: 2020-08-11

## 2020-08-11 DIAGNOSIS — J45.50 SEVERE PERSISTENT ASTHMA WITHOUT COMPLICATION: ICD-10-CM

## 2020-08-11 RX ORDER — ALBUTEROL SULFATE 90 UG/1
2 AEROSOL, METERED RESPIRATORY (INHALATION) EVERY 6 HOURS PRN
Qty: 8.5 G | Refills: 11 | Status: SHIPPED | OUTPATIENT
Start: 2020-08-11 | End: 2021-05-25

## 2020-09-17 DIAGNOSIS — J45.50 SEVERE PERSISTENT ASTHMA WITHOUT COMPLICATION: ICD-10-CM

## 2020-09-18 RX ORDER — MONTELUKAST SODIUM 10 MG/1
TABLET ORAL
Qty: 90 TAB | Refills: 0 | Status: SHIPPED | OUTPATIENT
Start: 2020-09-18 | End: 2020-10-27 | Stop reason: SDUPTHER

## 2020-10-27 ENCOUNTER — PATIENT MESSAGE (OUTPATIENT)
Dept: MEDICAL GROUP | Facility: MEDICAL CENTER | Age: 45
End: 2020-10-27

## 2020-10-27 DIAGNOSIS — J39.2 LESION OF PHARYNX: ICD-10-CM

## 2020-10-27 DIAGNOSIS — J45.50 SEVERE PERSISTENT ASTHMA WITHOUT COMPLICATION: ICD-10-CM

## 2020-10-27 DIAGNOSIS — L40.9 PSORIASIS: ICD-10-CM

## 2020-10-27 NOTE — PATIENT COMMUNICATION
1. Caller Name: Marcela Mercer                          Call Back Number: There are no phone numbers on file.        How would the patient prefer to be contacted with a response: Hire An [employer] message    2. SPECIFIC Action To Be Taken: Referral pending, please sign.    3. Diagnosis/Clinical Reason for Request: Lesion of pharynx J39.2    4. Specialty & Provider Name/Lab/Imaging Location: ENT    5. Is appointment scheduled for requested order/referral: no    Does pt need appt? Please advise

## 2020-10-27 NOTE — TELEPHONE ENCOUNTER
From: Marcela Mercer  To: Tiffany Robles P.A.-C.  Sent: 10/27/2020 12:32 PM PDT  Subject: Non-Urgent Medical Question    Good afternoon Tiffany Robles P.A.-C! Last year you put in a referral to the ENT, however I never found the opportunity to go. I do have new insurance. Please see attached. I am wondering if you could re-submit the referral? I still have the growth in the back of my throat and I think that the polyps in my sinuses have grown. My last sinus surgery to remove the polyps was about 5 years ago, I have had 4 surgeries over the years. It is becoming increasingly more difficult to breath and I have no sense of smell. Thank you so much!  [facility]  [address] [place] [address]. [id number]  Phone: [phone]  Fax: [phone]

## 2020-10-28 RX ORDER — MONTELUKAST SODIUM 10 MG/1
10 TABLET ORAL
Qty: 90 TAB | Refills: 0 | Status: SHIPPED | OUTPATIENT
Start: 2020-10-28 | End: 2021-01-29

## 2020-10-28 RX ORDER — FLUOCINONIDE TOPICAL SOLUTION USP, 0.05% 0.5 MG/ML
SOLUTION TOPICAL
Qty: 1 ML | Refills: 5 | Status: SHIPPED | OUTPATIENT
Start: 2020-10-28 | End: 2021-09-15 | Stop reason: SDUPTHER

## 2020-11-03 DIAGNOSIS — Z00.00 PREVENTATIVE HEALTH CARE: ICD-10-CM

## 2020-11-18 DIAGNOSIS — I10 ESSENTIAL HYPERTENSION: ICD-10-CM

## 2020-11-18 RX ORDER — LOSARTAN POTASSIUM 50 MG/1
50 TABLET ORAL DAILY
Qty: 90 TAB | Refills: 1 | Status: SHIPPED | OUTPATIENT
Start: 2020-11-18 | End: 2021-05-25

## 2021-01-29 DIAGNOSIS — J45.50 SEVERE PERSISTENT ASTHMA WITHOUT COMPLICATION: ICD-10-CM

## 2021-01-29 RX ORDER — MONTELUKAST SODIUM 10 MG/1
TABLET ORAL
Qty: 90 TAB | Refills: 0 | Status: SHIPPED | OUTPATIENT
Start: 2021-01-29

## 2021-01-29 NOTE — TELEPHONE ENCOUNTER
Received request via: Pharmacy    Was the patient seen in the last year in this department? No     Does the patient have an active prescription (recently filled or refills available) for medication(s) requested? No    lvm for pt to schedule appt as it has been over a year

## 2021-02-17 DIAGNOSIS — J45.50 SEVERE PERSISTENT ASTHMA WITHOUT COMPLICATION: ICD-10-CM

## 2021-02-17 RX ORDER — MONTELUKAST SODIUM 10 MG/1
TABLET ORAL
Qty: 90 TABLET | Refills: 3 | Status: SHIPPED | OUTPATIENT
Start: 2021-02-17 | End: 2022-05-10

## 2021-03-30 ENCOUNTER — TELEMEDICINE (OUTPATIENT)
Dept: MEDICAL GROUP | Facility: MEDICAL CENTER | Age: 46
End: 2021-03-30
Payer: COMMERCIAL

## 2021-03-30 VITALS — HEIGHT: 65 IN | WEIGHT: 205 LBS | BODY MASS INDEX: 34.16 KG/M2 | TEMPERATURE: 98.2 F

## 2021-03-30 DIAGNOSIS — J45.50 SEVERE PERSISTENT ASTHMA WITHOUT COMPLICATION: ICD-10-CM

## 2021-03-30 DIAGNOSIS — J33.9 NASAL POLYP: ICD-10-CM

## 2021-03-30 DIAGNOSIS — J45.901 PERSISTENT ASTHMA WITH ACUTE EXACERBATION, UNSPECIFIED ASTHMA SEVERITY: ICD-10-CM

## 2021-03-30 DIAGNOSIS — R05.9 COUGH: ICD-10-CM

## 2021-03-30 PROCEDURE — 99213 OFFICE O/P EST LOW 20 MIN: CPT | Mod: 95,CR | Performed by: PHYSICIAN ASSISTANT

## 2021-03-30 RX ORDER — METHYLPREDNISOLONE 4 MG/1
TABLET ORAL
Qty: 21 TABLET | Refills: 0 | Status: SHIPPED | OUTPATIENT
Start: 2021-03-30 | End: 2021-10-12

## 2021-03-30 RX ORDER — FLUTICASONE PROPIONATE 93 UG/1
SPRAY, METERED NASAL
COMMUNITY

## 2021-03-30 RX ORDER — BENZONATATE 100 MG/1
100 CAPSULE ORAL 3 TIMES DAILY PRN
Qty: 30 CAPSULE | Refills: 0 | Status: SHIPPED | OUTPATIENT
Start: 2021-03-30 | End: 2021-10-12

## 2021-03-30 ASSESSMENT — PATIENT HEALTH QUESTIONNAIRE - PHQ9: CLINICAL INTERPRETATION OF PHQ2 SCORE: 0

## 2021-03-30 NOTE — PROGRESS NOTES
"This visit was conducted utilizing secure and encrypted videoconferencing equipment, with the patient's consent.    Patient's identity was verified.          Chief Complaint   Patient presents with   • Medication Refill       HPI:     Marcela Mercer is a 46 y.o. female. Patient is presenting to discuss: Cough and asthma exacerbation.    Patient was previously on Advair daily and albuterol as needed.  States she would use albuterol about 4 times a day.  Her symptoms has been worse since she has been out of Advair for over a week now.  Uses albuterol more frequently daily.  Positive shortness of breath and cough.  Also today patient started having congestion and cold-like symptoms.  Feels like she is getting a cold.  No fevers no chills no change in the smell.     Patient is under care of ENT, was told she has nasal polyps and needs another surgery.        Past medical, surgical, family, and social history is reviewed in Epic chart by me today.   Medications and allergies reviewed and updated in Epic chart by me today.          Objective:     Temp 36.8 °C (98.2 °F) (Temporal)   Ht 1.651 m (5' 5\")   Wt 93 kg (205 lb)  Body mass index is 34.11 kg/m².   Physical Exam:  Pain: sounds comfortable   Constitutional: Alert, no distress.  Eye: pupils Equal, conjunctiva clear,   Respiratory: Unlabored respiratory effort, speaking in full sentences, no audible wheezes.           Assessment and Plan:   The following treatment plan was discussed      1. Nasal polyp      2. Persistent asthma with acute exacerbation, unspecified asthma severity    - methylPREDNISolone (MEDROL DOSEPAK) 4 MG Tablet Therapy Pack; As directed on the packaging label.  Dispense: 21 tablet; Refill: 0  - fluticasone-salmeterol (ADVAIR) 250-50 MCG/DOSE AEROSOL POWDER, BREATH ACTIVATED; Inhale 1 Puff every 12 hours.  Dispense: 60 Each; Refill: 11    3. Severe persistent asthma without complication  Discussed with patient that her asthma symptoms is not " controlled if she has to use albuterol 4 times a day with daily Advair and montelukast. (Symptoms are worse now due to cold and no Advair)     - fluticasone-salmeterol (ADVAIR) 250-50 MCG/DOSE AEROSOL POWDER, BREATH ACTIVATED; Inhale 1 Puff every 12 hours.  Dispense: 60 Each; Refill: 11  - REFERRAL TO PULMONARY AND SLEEP MEDICINE    4. Cough    - benzonatate (TESSALON) 100 MG Cap; Take 1 capsule by mouth 3 times a day as needed for Cough.  Dispense: 30 capsule; Refill: 0        F/U advised patient to follow-up if symptoms fail to improve in 7 days, sooner if worsen

## 2021-05-23 DIAGNOSIS — I10 ESSENTIAL HYPERTENSION: ICD-10-CM

## 2021-05-23 DIAGNOSIS — J45.50 SEVERE PERSISTENT ASTHMA WITHOUT COMPLICATION: ICD-10-CM

## 2021-05-25 RX ORDER — ALBUTEROL SULFATE 90 UG/1
2 AEROSOL, METERED RESPIRATORY (INHALATION) EVERY 6 HOURS PRN
Qty: 8.5 G | Refills: 11 | Status: SHIPPED | OUTPATIENT
Start: 2021-05-25

## 2021-05-25 RX ORDER — LOSARTAN POTASSIUM 50 MG/1
TABLET ORAL
Qty: 90 TABLET | Refills: 1 | Status: SHIPPED | OUTPATIENT
Start: 2021-05-25 | End: 2021-07-27

## 2021-06-16 ENCOUNTER — APPOINTMENT (OUTPATIENT)
Dept: SLEEP MEDICINE | Facility: MEDICAL CENTER | Age: 46
End: 2021-06-16
Payer: COMMERCIAL

## 2021-07-24 DIAGNOSIS — I10 ESSENTIAL HYPERTENSION: ICD-10-CM

## 2021-07-27 RX ORDER — LOSARTAN POTASSIUM 50 MG/1
TABLET ORAL
Qty: 90 TABLET | Refills: 1 | Status: SHIPPED | OUTPATIENT
Start: 2021-07-27 | End: 2021-10-12

## 2021-09-15 ENCOUNTER — PATIENT MESSAGE (OUTPATIENT)
Dept: MEDICAL GROUP | Facility: MEDICAL CENTER | Age: 46
End: 2021-09-15

## 2021-09-15 DIAGNOSIS — L40.9 PSORIASIS: ICD-10-CM

## 2021-09-15 RX ORDER — FLUOCINONIDE TOPICAL SOLUTION USP, 0.05% 0.5 MG/ML
SOLUTION TOPICAL
Qty: 1 ML | Refills: 5 | Status: SHIPPED | OUTPATIENT
Start: 2021-09-15

## 2021-09-15 NOTE — TELEPHONE ENCOUNTER
From: Marcela Mercer  To: Physician Assistant Tiffany Robles  Sent: 9/15/2021 9:27 AM PDT  Subject: perscription for fluocinonide    Good morning! I was wondering if you could please call in a prescription for the oil based fluocinonide? I would love it if you could send that to the [facility] in [facility]. Thank you.

## 2021-10-11 ENCOUNTER — HOSPITAL ENCOUNTER (EMERGENCY)
Facility: MEDICAL CENTER | Age: 46
End: 2021-10-12
Attending: EMERGENCY MEDICINE
Payer: COMMERCIAL

## 2021-10-11 ENCOUNTER — APPOINTMENT (OUTPATIENT)
Dept: RADIOLOGY | Facility: MEDICAL CENTER | Age: 46
End: 2021-10-11
Attending: EMERGENCY MEDICINE
Payer: COMMERCIAL

## 2021-10-11 DIAGNOSIS — U07.1 COVID: ICD-10-CM

## 2021-10-11 DIAGNOSIS — J18.9 ATYPICAL PNEUMONIA: ICD-10-CM

## 2021-10-11 DIAGNOSIS — R06.02 SOB (SHORTNESS OF BREATH): ICD-10-CM

## 2021-10-11 DIAGNOSIS — R05.9 COUGH: ICD-10-CM

## 2021-10-11 LAB
ALBUMIN SERPL BCP-MCNC: 4.4 G/DL (ref 3.2–4.9)
ALBUMIN/GLOB SERPL: 1.3 G/DL
ALP SERPL-CCNC: 65 U/L (ref 30–99)
ALT SERPL-CCNC: 21 U/L (ref 2–50)
ANION GAP SERPL CALC-SCNC: 16 MMOL/L (ref 7–16)
AST SERPL-CCNC: 27 U/L (ref 12–45)
BASOPHILS # BLD AUTO: 0.4 % (ref 0–1.8)
BASOPHILS # BLD: 0.02 K/UL (ref 0–0.12)
BILIRUB SERPL-MCNC: 0.3 MG/DL (ref 0.1–1.5)
BUN SERPL-MCNC: 12 MG/DL (ref 8–22)
CALCIUM SERPL-MCNC: 8.8 MG/DL (ref 8.5–10.5)
CHLORIDE SERPL-SCNC: 96 MMOL/L (ref 96–112)
CO2 SERPL-SCNC: 21 MMOL/L (ref 20–33)
CREAT SERPL-MCNC: 1 MG/DL (ref 0.5–1.4)
EOSINOPHIL # BLD AUTO: 0 K/UL (ref 0–0.51)
EOSINOPHIL NFR BLD: 0 % (ref 0–6.9)
ERYTHROCYTE [DISTWIDTH] IN BLOOD BY AUTOMATED COUNT: 44.5 FL (ref 35.9–50)
GLOBULIN SER CALC-MCNC: 3.5 G/DL (ref 1.9–3.5)
GLUCOSE SERPL-MCNC: 122 MG/DL (ref 65–99)
HCT VFR BLD AUTO: 42.1 % (ref 37–47)
HGB BLD-MCNC: 14.7 G/DL (ref 12–16)
IMM GRANULOCYTES # BLD AUTO: 0.02 K/UL (ref 0–0.11)
IMM GRANULOCYTES NFR BLD AUTO: 0.4 % (ref 0–0.9)
LYMPHOCYTES # BLD AUTO: 0.65 K/UL (ref 1–4.8)
LYMPHOCYTES NFR BLD: 12.3 % (ref 22–41)
MCH RBC QN AUTO: 29.5 PG (ref 27–33)
MCHC RBC AUTO-ENTMCNC: 34.9 G/DL (ref 33.6–35)
MCV RBC AUTO: 84.5 FL (ref 81.4–97.8)
MONOCYTES # BLD AUTO: 0.34 K/UL (ref 0–0.85)
MONOCYTES NFR BLD AUTO: 6.5 % (ref 0–13.4)
NEUTROPHILS # BLD AUTO: 4.24 K/UL (ref 2–7.15)
NEUTROPHILS NFR BLD: 80.4 % (ref 44–72)
NRBC # BLD AUTO: 0 K/UL
NRBC BLD-RTO: 0 /100 WBC
PLATELET # BLD AUTO: 229 K/UL (ref 164–446)
PMV BLD AUTO: 9.8 FL (ref 9–12.9)
POTASSIUM SERPL-SCNC: 3.8 MMOL/L (ref 3.6–5.5)
PROT SERPL-MCNC: 7.9 G/DL (ref 6–8.2)
RBC # BLD AUTO: 4.98 M/UL (ref 4.2–5.4)
SODIUM SERPL-SCNC: 133 MMOL/L (ref 135–145)
WBC # BLD AUTO: 5.3 K/UL (ref 4.8–10.8)

## 2021-10-11 PROCEDURE — 93005 ELECTROCARDIOGRAM TRACING: CPT | Performed by: EMERGENCY MEDICINE

## 2021-10-11 PROCEDURE — 700105 HCHG RX REV CODE 258: Performed by: EMERGENCY MEDICINE

## 2021-10-11 PROCEDURE — 700111 HCHG RX REV CODE 636 W/ 250 OVERRIDE (IP): Performed by: EMERGENCY MEDICINE

## 2021-10-11 PROCEDURE — 85025 COMPLETE CBC W/AUTO DIFF WBC: CPT

## 2021-10-11 PROCEDURE — 96365 THER/PROPH/DIAG IV INF INIT: CPT

## 2021-10-11 PROCEDURE — 99284 EMERGENCY DEPT VISIT MOD MDM: CPT

## 2021-10-11 PROCEDURE — 700101 HCHG RX REV CODE 250

## 2021-10-11 PROCEDURE — A9270 NON-COVERED ITEM OR SERVICE: HCPCS | Performed by: EMERGENCY MEDICINE

## 2021-10-11 PROCEDURE — 94640 AIRWAY INHALATION TREATMENT: CPT

## 2021-10-11 PROCEDURE — 71045 X-RAY EXAM CHEST 1 VIEW: CPT

## 2021-10-11 PROCEDURE — 700102 HCHG RX REV CODE 250 W/ 637 OVERRIDE(OP): Performed by: EMERGENCY MEDICINE

## 2021-10-11 PROCEDURE — 96375 TX/PRO/DX INJ NEW DRUG ADDON: CPT

## 2021-10-11 PROCEDURE — 700101 HCHG RX REV CODE 250: Performed by: EMERGENCY MEDICINE

## 2021-10-11 PROCEDURE — 80053 COMPREHEN METABOLIC PANEL: CPT

## 2021-10-11 RX ORDER — SODIUM CHLORIDE 9 MG/ML
1000 INJECTION, SOLUTION INTRAVENOUS ONCE
Status: COMPLETED | OUTPATIENT
Start: 2021-10-11 | End: 2021-10-11

## 2021-10-11 RX ORDER — DEXAMETHASONE SODIUM PHOSPHATE 10 MG/ML
10 INJECTION, SOLUTION INTRAMUSCULAR; INTRAVENOUS ONCE
Status: DISCONTINUED | OUTPATIENT
Start: 2021-10-11 | End: 2021-10-11

## 2021-10-11 RX ORDER — IPRATROPIUM BROMIDE AND ALBUTEROL SULFATE 2.5; .5 MG/3ML; MG/3ML
SOLUTION RESPIRATORY (INHALATION)
Status: COMPLETED
Start: 2021-10-11 | End: 2021-10-11

## 2021-10-11 RX ORDER — AZITHROMYCIN 500 MG/1
500 INJECTION, POWDER, LYOPHILIZED, FOR SOLUTION INTRAVENOUS ONCE
Status: COMPLETED | OUTPATIENT
Start: 2021-10-11 | End: 2021-10-11

## 2021-10-11 RX ORDER — BENZONATATE 100 MG/1
200 CAPSULE ORAL ONCE
Status: COMPLETED | OUTPATIENT
Start: 2021-10-11 | End: 2021-10-11

## 2021-10-11 RX ORDER — DEXAMETHASONE SODIUM PHOSPHATE 4 MG/ML
10 INJECTION, SOLUTION INTRA-ARTICULAR; INTRALESIONAL; INTRAMUSCULAR; INTRAVENOUS; SOFT TISSUE ONCE
Status: COMPLETED | OUTPATIENT
Start: 2021-10-12 | End: 2021-10-11

## 2021-10-11 RX ORDER — MAGNESIUM SULFATE HEPTAHYDRATE 40 MG/ML
2 INJECTION, SOLUTION INTRAVENOUS ONCE
Status: COMPLETED | OUTPATIENT
Start: 2021-10-11 | End: 2021-10-11

## 2021-10-11 RX ORDER — IPRATROPIUM BROMIDE AND ALBUTEROL SULFATE 2.5; .5 MG/3ML; MG/3ML
3 SOLUTION RESPIRATORY (INHALATION) ONCE
Status: COMPLETED | OUTPATIENT
Start: 2021-10-11 | End: 2021-10-11

## 2021-10-11 RX ORDER — AZITHROMYCIN 500 MG/5ML
500 INJECTION, POWDER, LYOPHILIZED, FOR SOLUTION INTRAVENOUS EVERY 24 HOURS
Status: DISCONTINUED | OUTPATIENT
Start: 2021-10-12 | End: 2021-10-11

## 2021-10-11 RX ADMIN — IPRATROPIUM BROMIDE AND ALBUTEROL SULFATE 3 ML: .5; 2.5 SOLUTION RESPIRATORY (INHALATION) at 22:51

## 2021-10-11 RX ADMIN — MAGNESIUM SULFATE HEPTAHYDRATE 2 G: 40 INJECTION, SOLUTION INTRAVENOUS at 22:43

## 2021-10-11 RX ADMIN — DEXAMETHASONE SODIUM PHOSPHATE 10 MG: 4 INJECTION, SOLUTION INTRA-ARTICULAR; INTRALESIONAL; INTRAMUSCULAR; INTRAVENOUS; SOFT TISSUE at 23:44

## 2021-10-11 RX ADMIN — BENZONATATE 200 MG: 100 CAPSULE ORAL at 22:41

## 2021-10-11 RX ADMIN — IPRATROPIUM BROMIDE AND ALBUTEROL SULFATE 3 ML: .5; 2.5 SOLUTION RESPIRATORY (INHALATION) at 22:41

## 2021-10-11 RX ADMIN — SODIUM CHLORIDE 1000 ML: 9 INJECTION, SOLUTION INTRAVENOUS at 22:44

## 2021-10-11 RX ADMIN — AZITHROMYCIN MONOHYDRATE 500 MG: 500 INJECTION, POWDER, LYOPHILIZED, FOR SOLUTION INTRAVENOUS at 22:42

## 2021-10-11 ASSESSMENT — COPD QUESTIONNAIRES
DURING THE PAST 4 WEEKS HOW MUCH DID YOU FEEL SHORT OF BREATH: NONE/LITTLE OF THE TIME
DO YOU EVER COUGH UP ANY MUCUS OR PHLEGM?: NO/ONLY WITH OCCASIONAL COLDS OR INFECTIONS
COPD SCREENING SCORE: 1
HAVE YOU SMOKED AT LEAST 100 CIGARETTES IN YOUR ENTIRE LIFE: NO/DON'T KNOW

## 2021-10-11 NOTE — ED TRIAGE NOTES
Marcela Mercer  46 y.o. female  Chief Complaint   Patient presents with   • Shortness of Breath     SOB that started approximately thursday, last night pt reports worsening SOB; attempted to use inhaler but unable to inhale medications. Cough. Not vaccinated       Vitals:    10/11/21 1648   BP: 138/80   Pulse: (!) 110   Resp: 18   Temp: 36.3 °C (97.4 °F)   SpO2: 96%        Patient ambulatory into triage for the complaint above of SOB - pt reports SOB that started Thursday and last night the breathing got worse, SOB increased. Pt attempted to use inhaler, but not able to get medication due to the inability to get medication inhaled. Pt also presents with cough. Denies any contact with COVID patients, but pt does report that they are not vaccinated.      Patient is in stable condition at time of triage, has been educated on the triage process and placed back into the senior lounge - pt has verbalized understanding of this process.     RN encouraged patient to alert staff at front for any changes that may occur while they are waiting to be evaluated by an ERP.     Of note, this RN and patient are in proper PPE and has a mask in place at all times during this encounter.     Past Medical History:   Diagnosis Date   • Allergy    • Hypertension    • Psoriasis

## 2021-10-12 VITALS
WEIGHT: 200 LBS | HEART RATE: 101 BPM | HEIGHT: 65 IN | OXYGEN SATURATION: 94 % | TEMPERATURE: 98 F | DIASTOLIC BLOOD PRESSURE: 68 MMHG | SYSTOLIC BLOOD PRESSURE: 115 MMHG | RESPIRATION RATE: 26 BRPM | BODY MASS INDEX: 33.32 KG/M2

## 2021-10-12 LAB — EKG IMPRESSION: NORMAL

## 2021-10-12 RX ORDER — BENZONATATE 200 MG/1
200 CAPSULE ORAL 3 TIMES DAILY PRN
Qty: 30 CAPSULE | Refills: 0 | Status: SHIPPED | OUTPATIENT
Start: 2021-10-12 | End: 2021-10-22

## 2021-10-12 RX ORDER — AZITHROMYCIN 250 MG/1
TABLET, FILM COATED ORAL
Qty: 6 TABLET | Refills: 0 | Status: SHIPPED | OUTPATIENT
Start: 2021-10-12 | End: 2022-01-09

## 2021-10-12 RX ORDER — PREDNISONE 20 MG/1
40 TABLET ORAL DAILY
Qty: 10 TABLET | Refills: 0 | Status: SHIPPED | OUTPATIENT
Start: 2021-10-12 | End: 2021-10-15 | Stop reason: SDUPTHER

## 2021-10-12 NOTE — ED NOTES
Pt from the lobby to green 26 with steady gait. Pt changed into gown and placed on continuous cardiac, BP and O2 monitors. Initial assessment complete. ERP to see.

## 2021-10-12 NOTE — ED PROVIDER NOTES
ED Provider Note    CHIEF COMPLAINT  Chief Complaint   Patient presents with   • Shortness of Breath     SOB that started approximately thursday, last night pt reports worsening SOB; attempted to use inhaler but unable to inhale medications. Cough. Not vaccinated     HPI  Patient is a 46-year-old female with extensive history of longstanding persistent asthma who presents emergency room for ongoing shortness of breath and sensations that she cannot get a full breath.  She had a sick contact with her daughter who had fevers, chills over the last week and she subsequently developed a cough, sore throat, body aches chills and fever as high as 104 over the last 3 to 4 days.  She is unvaccinated against Covid and her child has a positive for Covid on Thursday and she tested positive for Covid on Friday.  She has been taking Tylenol, breathing medications and feels like her breathing is getting worse.  At the time my evaluation she is speaking in full sentences, she has oxygen saturations on room air at 92%.  Today in addition to all of her shortness of breath complaints she was complaining of multiple episodes of diarrhea over the last several days and additionally had several episodes of posttussive emesis.  She states that her mouth is feeling perpetually dry and she has a hard time keeping fluids and and taking her breathing medications due to her persistent coughing.    PPE Note: I personally donned full PPE for all patient encounters during this visit, including being clean-shaven with an N95 respirator mask, gloves, and goggles.     No prior history of blood clots, no recent travel, no recent surgery, no immobilization, no recent leg edema or asymmetry.  No prior history of DVT and denies OCP use.    REVIEW OF SYSTEMS  See HPI for further details. All other systems are negative.     PAST MEDICAL HISTORY   has a past medical history of Allergy, Hypertension, and Psoriasis.    SOCIAL HISTORY  Social History  "    Tobacco Use   • Smoking status: Never Smoker   • Smokeless tobacco: Never Used   Vaping Use   • Vaping Use: Never used   Substance and Sexual Activity   • Alcohol use: Yes     Comment: occ   • Drug use: No   • Sexual activity: Not Currently     Birth control/protection: I.U.D.       SURGICAL HISTORY   has a past surgical history that includes sinuscopy.    CURRENT MEDICATIONS  Home Medications     Reviewed by Paul Van R.N. (Registered Nurse) on 10/11/21 at 1651  Med List Status: Not Addressed   Medication Last Dose Status   albuterol 108 (90 Base) MCG/ACT Aero Soln inhalation aerosol  Active   benzonatate (TESSALON) 100 MG Cap  Active   betamethasone dipropionate (DIPROLENE) 0.05 % Ointment  Active   cetirizine (ZYRTEC) 10 MG Tab  Active   fluocinonide (LIDEX) 0.05 % external solution  Active   Fluticasone Propionate (XHANCE) 93 MCG/ACT Exhaler Suspension  Active   fluticasone-salmeterol (ADVAIR) 250-50 MCG/DOSE AEROSOL POWDER, BREATH ACTIVATED  Active   losartan (COZAAR) 25 MG Tab  Active   losartan (COZAAR) 25 MG Tab  Active   losartan (COZAAR) 50 MG Tab  Active   methylPREDNISolone (MEDROL DOSEPAK) 4 MG Tablet Therapy Pack  Active   montelukast (SINGULAIR) 10 MG Tab  Active   montelukast (SINGULAIR) 10 MG Tab  Active   WIXELA INHUB 500-50 MCG/DOSE AEROSOL POWDER, BREATH ACTIVATED  Active                ALLERGIES  Allergies   Allergen Reactions   • Asa [Aspirin] Anaphylaxis   • Penicillins    • Sulfa Drugs    • Tetracycline        PHYSICAL EXAM  VITAL SIGNS: /68   Pulse (!) 101   Temp 37.3 °C (99.2 °F) (Temporal)   Resp 16   Ht 1.651 m (5' 5\")   Wt 90.7 kg (200 lb)   SpO2 94%   BMI 33.28 kg/m²   Pulse ox interpretation: I interpret this pulse ox as normal.  Genl: F sitting in gurney uncomfortably, speaking clearly, appears in mild distress   Head: NC/AT   ENT: Mucous membranes dry, posterior pharynx clear, uvula midline, nares patent bilaterally   Eyes: Normal sclera, pupils equal round " reactive to light  Neck: Supple, FROM, no LAD appreciated  Pulmonary: Lungs are clear to auscultation bilaterally  Chest: No TTP  CV:  tachycardia, no murmur appreciated, pulses 2+ in both upper and lower extremities,  Abdomen: soft, NT/ND; no rebound/guarding, no masses palpated, no HSM  : no CVA or suprapubic tenderness   Musculoskeletal: Pain free ROM of the neck. Moving upper and lower extremities and spontaneous in coordinated fashion  Neuro: A&Ox4 (person, place, time, situation), speech fluent, gait steady, no focal deficits appreciated, No cerebellar signs. Sensation is grossly intact in the distal upper and lower extremities.  5/5 strength in  and dorsiflexion/plantar flexion of the ankles  Skin: No rash or lesions.  No pallor or jaundice.  No cyanosis.  Warm and dry.     DIAGNOSTIC STUDIES / PROCEDURES    EKG  Results for orders placed or performed during the hospital encounter of 10/11/21   EKG   Result Value Ref Range    Report       [facility] Emergency [facility]    Test Date:  2021-10-11  Pt Name:    JOSEMANUEL BELLO                  Department: ER  MRN:        [id number]                      Room:       [facility]  Gender:     Female                       Technician: [id number]  :        1975                   Requested By:ER TRIAGE PROTOCOL  Order #:    [id number]                    Reading MD: Doroteo Sterling MD    Measurements  Intervals                                Axis  Rate:       106                          P:          43  MI:         140                          QRS:        8  QRSD:       74                           T:          36  QT:         324  QTc:        431    Interpretive Statements  SINUS TACHYCARDIA  No previous ECG available for comparison  Electronically Signed On 10- 0:34:14 PDT by Doroteo Sterling MD       LABS  Labs Reviewed   CBC WITH DIFFERENTIAL - Abnormal; Notable for the following components:       Result Value    Neutrophils-Polys 80.40 (*)     Lymphocytes  12.30 (*)     Lymphs (Absolute) 0.65 (*)     All other components within normal limits   COMP METABOLIC PANEL - Abnormal; Notable for the following components:    Sodium 133 (*)     Glucose 122 (*)     All other components within normal limits   ESTIMATED GFR - Abnormal; Notable for the following components:    GFR If Non  59 (*)     All other components within normal limits     RADIOLOGY  DX-CHEST-PORTABLE (1 VIEW)   Final Result      Mild hazy left basilar opacity which could represent mild pneumonitis is not excluded.        COURSE & MEDICAL DECISION MAKING  Pertinent Labs & Imaging studies reviewed. (See chart for details)    DDX: COVID-19, asthma exacerbation, bacterial pneumonia unlikely, viral syndrome, dehydration, electrolyte derangement, kidney injury    MDM    Initial evaluation at 2138:  Patient presents emergency room for symptoms as described above.  The patient has a positive Covid diagnosis, has underlying asthma time my evaluation is not hypoxic, speaking full sentences and is tachycardic and has recent history of febrile illness for which she has been taking Tylenol.  She is not hypotensive, she is not appearing in acute respiratory distress and lab work obtained in triage shows an x-ray that shows mild hazy left bibasilar opacities that could represent pneumonitis.  With the positive Covid diagnosis she likely has the inflammatory changes along with some exacerbation of her underlying reactive airway disease.  DuoNeb was given, administration of Decadron for her concurrent Covid/asthma exacerbation and with her bibasilar opacities I will give her initial dose of azithromycin.    Following treatment with symptomatic medications that patient does have some alleviation and she is ambulated with no acute hypoxia. She has no evidence of acute septic etiology and has a known Covid diagnosis started on steroids as she has longstanding asthma. The patient has home albuterol medications and  nebulizers. Her x-ray shows nonspecific bibasilar opacity that could be the early findings of Covid but also represents an atypical pneumonia.    Patient will be sent home on Tessalon, azithromycin, prednisone. Discharged home in stable condition.    She verbalized understanding of the diagnosis, the need for vaccination after 14 days of clearance of her current condition and questions are addressed to the bedside.    HYDRATION: Based on the patient's presentation of Acute Diarrhea, Acute Vomiting, Dehydration and Tachycardia the patient was given IV fluids. IV Hydration was used because oral hydration was not adequate alone. Upon recheck following hydration, the patient was improved.    FINAL IMPRESSION  Visit Diagnoses     ICD-10-CM   1. COVID  U07.1   2. Cough  R05.9   3. SOB (shortness of breath)  R06.02   4. Atypical pneumonia  J18.9     Electronically signed by: Asher Sadler M.D., 10/11/2021 9:38 PM

## 2021-10-12 NOTE — ED NOTES
Discharge teaching with paperwork regarding COVID-19 and new prescriptions provided to pt. Pt verbalized understanding of teaching and all questions answered. Pt is A&Ox4 with stable vital signs, stable physical assessment, and PIV removed upon discharge. Pt ambulatory out of ED with steady gait with all personal belongings.

## 2021-10-14 NOTE — TELEPHONE ENCOUNTER
Placed Pt on 2 liters nasal cannula. As Pt was fell asleep her SpO2 was in the mid 80s. Currently on the 2 liters Pt is 99% may reduce to 1 liter if Pt is maintaining or discontinue. Readjusted monitor several times to get a better reading before adding nasal cannula.        Taras Bautista RN  10/14/21 0102 Was the patient seen in the last year in this department? Yes    Does patient have an active prescription for medications requested? No     Received Request Via: Pharmacy

## 2021-10-15 ENCOUNTER — PATIENT MESSAGE (OUTPATIENT)
Dept: MEDICAL GROUP | Facility: MEDICAL CENTER | Age: 46
End: 2021-10-15

## 2021-10-15 ENCOUNTER — HOSPITAL ENCOUNTER (EMERGENCY)
Facility: MEDICAL CENTER | Age: 46
End: 2021-10-15
Attending: EMERGENCY MEDICINE
Payer: COMMERCIAL

## 2021-10-15 ENCOUNTER — TELEPHONE (OUTPATIENT)
Dept: MEDICAL GROUP | Facility: MEDICAL CENTER | Age: 46
End: 2021-10-15

## 2021-10-15 VITALS
TEMPERATURE: 97.5 F | HEART RATE: 100 BPM | BODY MASS INDEX: 34.01 KG/M2 | RESPIRATION RATE: 20 BRPM | OXYGEN SATURATION: 93 % | SYSTOLIC BLOOD PRESSURE: 132 MMHG | DIASTOLIC BLOOD PRESSURE: 77 MMHG | HEIGHT: 65 IN | WEIGHT: 204.15 LBS

## 2021-10-15 DIAGNOSIS — U07.1 COVID-19: ICD-10-CM

## 2021-10-15 DIAGNOSIS — R06.02 SOB (SHORTNESS OF BREATH): ICD-10-CM

## 2021-10-15 LAB — EKG IMPRESSION: NORMAL

## 2021-10-15 PROCEDURE — 93005 ELECTROCARDIOGRAM TRACING: CPT | Performed by: EMERGENCY MEDICINE

## 2021-10-15 PROCEDURE — 99283 EMERGENCY DEPT VISIT LOW MDM: CPT

## 2021-10-15 PROCEDURE — 93005 ELECTROCARDIOGRAM TRACING: CPT

## 2021-10-15 PROCEDURE — 700111 HCHG RX REV CODE 636 W/ 250 OVERRIDE (IP): Performed by: EMERGENCY MEDICINE

## 2021-10-15 PROCEDURE — 96372 THER/PROPH/DIAG INJ SC/IM: CPT

## 2021-10-15 PROCEDURE — M0243 CASIRIVI AND IMDEVI INFUSION: HCPCS

## 2021-10-15 RX ORDER — PREDNISONE 20 MG/1
40 TABLET ORAL DAILY
Qty: 10 TABLET | Refills: 0 | Status: SHIPPED | OUTPATIENT
Start: 2021-10-15 | End: 2021-10-20

## 2021-10-15 RX ORDER — PREDNISONE 20 MG/1
40 TABLET ORAL DAILY
Qty: 10 TABLET | Refills: 0
Start: 2021-10-15 | End: 2021-10-20

## 2021-10-15 RX ADMIN — CASIRIVIMAB AND IMDEVIMAB 300 MG: 600; 600 INJECTION, SOLUTION, CONCENTRATE INTRAVENOUS at 20:46

## 2021-10-15 ASSESSMENT — LIFESTYLE VARIABLES: DO YOU DRINK ALCOHOL: NO

## 2021-10-15 ASSESSMENT — FIBROSIS 4 INDEX: FIB4 SCORE: 1.21

## 2021-10-15 NOTE — PATIENT COMMUNICATION
Received request via: Patient    Was the patient seen in the last year in this department? Yes    Does the patient have an active prescription (recently filled or refills available) for medication(s) requested? No     Pt requesting more steroids until she is able to receive the regeneron infusion.

## 2021-10-15 NOTE — TELEPHONE ENCOUNTER
Please inform patient that I talked to [facility] infusion therapy clinical pharmacist and unfortunately he said that they cannot do the infusion today and they are closed over the weekend. By Monday she will fall out of the 10-day window.  So she has to go to ER if she wants to receive Regeneron within the timeframe.    It is unfortunate, I sent her a message on Oct 12th, I wish she would have responded sooner. Today is Friday and infusion center is closed over weekend.     Tiffany Robles P.A.-C.

## 2021-10-16 NOTE — ED TRIAGE NOTES
"Chief Complaint   Patient presents with   • Coronavirus Screening     [facility] of [facility]/sx. + COVID on 10/8      Pt to triage for above. NAD noted. HR noted, other VSS.    /91   Pulse (!) 110   Temp 36.3 °C (97.3 °F) (Temporal)   Resp 20   Ht 1.651 m (5' 5\")   Wt 92.6 kg (204 lb 2.3 oz)   SpO2 92%   BMI 33.97 kg/m²     "

## 2021-10-16 NOTE — ED PROVIDER NOTES
ED Provider Note    CHIEF COMPLAINT  Chief Complaint   Patient presents with   • Coronavirus Screening     sent to get regenbienvenido. on day 8 of s/sx. + COVID on 10/8        HPI  Marcela Mercer is a 46 y.o. female who presents with a cough and difficulty with breathing.  The patient states she tested positive for COVID-19 on 8 October.  She states that subsequently she became ill.  She tested initially as her daughter was ill at home.  The patient presents emerge department requesting Regeneron.  She does have asthma as a risk factor.  She does have some shortness of breath as well as a continued cough.  She also has generalized malaise.  She has not had any recent vomiting.  She has not any change in bowel or bladder function.    REVIEW OF SYSTEMS  See HPI for further details. All other systems are negative.     PAST MEDICAL HISTORY  Past Medical History:   Diagnosis Date   • Allergy    • Hypertension    • Psoriasis        FAMILY HISTORY  [unfilled]    SOCIAL HISTORY  Social History     Socioeconomic History   • Marital status: Single     Spouse name: Not on file   • Number of children: Not on file   • Years of education: Not on file   • Highest education level: Not on file   Occupational History   • Not on file   Tobacco Use   • Smoking status: Never Smoker   • Smokeless tobacco: Never Used   Vaping Use   • Vaping Use: Never used   Substance and Sexual Activity   • Alcohol use: Yes     Comment: occ   • Drug use: No   • Sexual activity: Not Currently     Birth control/protection: I.U.D.   Other Topics Concern   • Not on file   Social History Narrative   • Not on file     Social Determinants of Health     Financial Resource Strain:    • Difficulty of Paying Living Expenses:    Food Insecurity:    • Worried About Running Out of Food in the Last Year:    • Ran Out of Food in the Last Year:    Transportation Needs:    • Lack of Transportation (Medical):    • Lack of Transportation (Non-Medical):    Physical Activity:   "  • Days of Exercise per Week:    • Minutes of Exercise per Session:    Stress:    • Feeling of Stress :    Social Connections:    • Frequency of Communication with Friends and Family:    • Frequency of Social Gatherings with Friends and Family:    • Attends [employer] Services:    • Active Member of Clubs or Organizations:    • Attends Club or Organization Meetings:    • Marital Status:    Intimate Partner Violence:    • Fear of Current or Ex-Partner:    • Emotionally Abused:    • Physically Abused:    • Sexually Abused:        SURGICAL HISTORY  Past Surgical History:   Procedure Laterality Date   • SINUSCOPY         CURRENT MEDICATIONS  Home Medications     Reviewed by Reji Loera R.N. (Registered Nurse) on 10/15/21 at 1831  Med List Status: Partial   Medication Last Dose Status   albuterol 108 (90 Base) MCG/ACT Aero Soln inhalation aerosol  Active   azithromycin (ZITHROMAX) 250 MG Tab  Active   benzonatate (TESSALON) 200 MG capsule  Active   betamethasone dipropionate (DIPROLENE) 0.05 % Ointment  Active   cetirizine (ZYRTEC) 10 MG Tab  Active   fluocinonide (LIDEX) 0.05 % external solution  Active   Fluticasone Propionate (XHANCE) 93 MCG/ACT Exhaler Suspension  Active   fluticasone-salmeterol (ADVAIR) 250-50 MCG/DOSE AEROSOL POWDER, BREATH ACTIVATED  Active   montelukast (SINGULAIR) 10 MG Tab  Active   montelukast (SINGULAIR) 10 MG Tab  Active   predniSONE (DELTASONE) 20 MG Tab  Active   WIXELA INHUB 500-50 MCG/DOSE AEROSOL POWDER, BREATH ACTIVATED  Active                ALLERGIES  Allergies   Allergen Reactions   • Asa [Aspirin] Anaphylaxis   • Penicillins    • Sulfa Drugs    • Tetracycline        PHYSICAL EXAM  VITAL SIGNS: /91   Pulse (!) 110   Temp 36.3 °C (97.3 °F) (Temporal)   Resp 20   Ht 1.651 m (5' 5\")   Wt 92.6 kg (204 lb 2.3 oz)   SpO2 92%   BMI 33.97 kg/m²       Constitutional: Well developed, Well nourished, No acute distress, Non-toxic appearance.   HENT: Normocephalic, Atraumatic, " Bilateral external ears normal, Oropharynx moist, No oral exudates, Nose normal.   Eyes: PERRLA, EOMI, Conjunctiva normal, No discharge.   Neck: Normal range of motion, No tenderness, Supple, No stridor.   Lymphatic: No lymphadenopathy noted.   Cardiovascular: Tachycardic heart rate, Normal rhythm, No murmurs, No rubs, No gallops.   Thorax & Lungs: Slightly diminished throughout, No respiratory distress, No wheezing, No chest tenderness.   Abdomen: Bowel sounds normal, Soft, No tenderness, No masses, No pulsatile masses.   Skin: Warm, Dry, No erythema, No rash.   Back: No tenderness, No CVA tenderness.   Extremities: Intact distal pulses, No edema, No tenderness, No cyanosis, No clubbing.   Neurologic: Alert & oriented x 3, Normal motor function, Normal sensory function, No focal deficits noted.   Psychiatric: Affect normal, Judgment normal, Mood normal.     COURSE & MEDICAL DECISION MAKING  Pertinent Labs & Imaging studies reviewed. (See chart for details)  This a 46-year-old female who presents the emergency department requesting Regeneron.  I discussed the case with pharmacy and the patient does meet approval.  Pharmacy will speak with the patient regarding treatment.  I discussed the risk of medication with the patient.  She does not appear toxic nor she hypoxic.  She will be discharged home with instructions to return for increased work of breathing.    FINAL IMPRESSION  1.  COVID-19    Disposition  The patient will be discharged in stable condition    Electronically signed by: Geo Arteaga M.D., 10/15/2021 7:06 PM

## 2021-10-21 ENCOUNTER — OFFICE VISIT (OUTPATIENT)
Dept: MEDICAL GROUP | Facility: MEDICAL CENTER | Age: 46
End: 2021-10-21
Payer: COMMERCIAL

## 2021-10-21 VITALS
HEART RATE: 84 BPM | DIASTOLIC BLOOD PRESSURE: 88 MMHG | WEIGHT: 201.6 LBS | SYSTOLIC BLOOD PRESSURE: 126 MMHG | OXYGEN SATURATION: 94 % | TEMPERATURE: 97.2 F | RESPIRATION RATE: 20 BRPM | BODY MASS INDEX: 33.59 KG/M2 | HEIGHT: 65 IN

## 2021-10-21 DIAGNOSIS — U07.1 COVID-19: ICD-10-CM

## 2021-10-21 DIAGNOSIS — Z00.00 PREVENTATIVE HEALTH CARE: ICD-10-CM

## 2021-10-21 DIAGNOSIS — J45.50 SEVERE PERSISTENT ASTHMA WITHOUT COMPLICATION: ICD-10-CM

## 2021-10-21 PROCEDURE — 99213 OFFICE O/P EST LOW 20 MIN: CPT | Mod: CS | Performed by: PHYSICIAN ASSISTANT

## 2021-10-21 RX ORDER — METHYLPREDNISOLONE 4 MG/1
TABLET ORAL
Qty: 21 TABLET | Refills: 0 | Status: SHIPPED | OUTPATIENT
Start: 2021-10-21 | End: 2021-10-27

## 2021-10-21 ASSESSMENT — FIBROSIS 4 INDEX: FIB4 SCORE: 1.21

## 2021-10-21 NOTE — PROGRESS NOTES
"Chief Complaint   Patient presents with   • Follow-Up     covid        HPI  Marcela Mercer is a 46 y.o. female here today for follow-up on Covid.    HPI:  Patient with history of severe persistent asthma had a positive Covid test 12 days ago.  Patient is unvaccinated.  States symptoms got severe, unable to breathe, states she thought she is not going to make it and she is going to die. Patient was advised to go to hospital on Friday and then she got Regeneron.  States after general she feels better.  Patient is on prednisone 40 mg daily, last dose today.  Has been on it for 10 days.  Patient has always had trouble sleeping at night however recently it has been worse.                Exam:  /88 (BP Location: Left arm, Patient Position: Sitting)   Pulse 84   Temp 36.2 °C (97.2 °F) (Temporal)   Resp 20   Ht 1.651 m (5' 5\")   Wt 91.4 kg (201 lb 9.6 oz)   SpO2 94%       Constitutional: Alert, oriented in no acute distress.  Psych: Eye contact is good, speech goal directed, affect calm  Eyes: Conjunctiva non-injected, sclera non-icteric.  Lungs: Unlabored respiratory effort, clear to auscultation bilaterally with good excursion, no wheez or rhonci  CV: regular rate and rhythm. No lower extremity edema        A/P:  1. Preventative health care    - CBC WITH DIFFERENTIAL; Future  - Comp Metabolic Panel; Future  - Lipid Profile; Future  - TSH WITH REFLEX TO FT4; Future  - MA-SCREENING MAMMO BILAT W/CAD; Future    2. COVID-19  We are going to taper off steroids gradually since she has been taking 40 mg of prednisone for the past 10 days  - methylPREDNISolone (MEDROL DOSEPAK) 4 MG Tablet Therapy Pack; 6 pills day 1, 5 pills day 2, 4 pills day 3, 3 pills day 4, 2 pills day 5, 1 pill day 6.  Dispense: 21 Tablet; Refill: 0    3. Severe persistent asthma without complication    - methylPREDNISolone (MEDROL DOSEPAK) 4 MG Tablet Therapy Pack; 6 pills day 1, 5 pills day 2, 4 pills day 3, 3 pills day 4, 2 pills day 5, " 1 pill day 6.  Dispense: 21 Tablet; Refill: 0    Advised patient that the steroid can affect sleep at night and this could be why her sleep has been worse.  We will discuss it after patient has recovered from Covid    Discussed getting Covid shot after she recovers      F/U: 4-6 wks

## 2021-11-16 DIAGNOSIS — I10 ESSENTIAL HYPERTENSION: ICD-10-CM

## 2021-11-16 RX ORDER — LOSARTAN POTASSIUM 50 MG/1
TABLET ORAL
Qty: 90 TABLET | Refills: 1 | OUTPATIENT
Start: 2021-11-16

## 2021-11-16 NOTE — TELEPHONE ENCOUNTER
Please inform patient that her sodium was low at 133 last time and I have ordered blood work for her to recheck that.  Losartan can also cause low sodium, I can approve her losartan refill before patient gets blood work done.      Tiffany Robles P.A.-C.

## 2021-12-08 RX ORDER — LOSARTAN POTASSIUM 50 MG/1
50 TABLET ORAL DAILY
Qty: 90 TABLET | Refills: 1 | Status: SHIPPED | OUTPATIENT
Start: 2021-12-08

## 2021-12-30 ENCOUNTER — OFFICE VISIT (OUTPATIENT)
Dept: URGENT CARE | Facility: CLINIC | Age: 46
End: 2021-12-30
Payer: COMMERCIAL

## 2021-12-30 VITALS
WEIGHT: 217.4 LBS | DIASTOLIC BLOOD PRESSURE: 70 MMHG | BODY MASS INDEX: 36.22 KG/M2 | TEMPERATURE: 97.4 F | HEIGHT: 65 IN | OXYGEN SATURATION: 97 % | RESPIRATION RATE: 16 BRPM | SYSTOLIC BLOOD PRESSURE: 126 MMHG | HEART RATE: 93 BPM

## 2021-12-30 DIAGNOSIS — R21 RASH AND NONSPECIFIC SKIN ERUPTION: ICD-10-CM

## 2021-12-30 PROCEDURE — 99213 OFFICE O/P EST LOW 20 MIN: CPT | Performed by: PHYSICIAN ASSISTANT

## 2021-12-30 RX ORDER — PREDNISONE 20 MG/1
20 TABLET ORAL DAILY
Qty: 30 TABLET | Refills: 0 | Status: SHIPPED | OUTPATIENT
Start: 2021-12-30 | End: 2022-01-29

## 2021-12-30 RX ORDER — HYDROXYZINE HYDROCHLORIDE 25 MG/1
25 TABLET, FILM COATED ORAL 3 TIMES DAILY PRN
Qty: 90 TABLET | Refills: 0 | Status: SHIPPED | OUTPATIENT
Start: 2021-12-30

## 2021-12-30 ASSESSMENT — FIBROSIS 4 INDEX: FIB4 SCORE: 1.21

## 2021-12-30 NOTE — PROGRESS NOTES
Subjective     Marcela Mercer is a 46 y.o. female who presents with Rash (feet, lower legs and arms, itchy )    Medications:    • albuterol Aers  • betamethasone dipropionate Oint  • cetirizine Tabs  • fluocinonide  • fluticasone-salmeterol Aepb  • hydrOXYzine HCl Tabs  • losartan Tabs  • montelukast Tabs  • predniSONE Tabs  • valacyclovir Tabs  • Wixela Inhub Aepb  • Xhance Exhu    Allergies: Asa [aspirin], Penicillins, Sulfa drugs, and Tetracycline    Problem List: Marcela Mercer does not have any pertinent problems on file.    Surgical History:  Past Surgical History:   Procedure Laterality Date   • SINUSCOPY         Past Social Hx: Marcela Mercer  reports that she has never smoked. She has never used smokeless tobacco. She reports current alcohol use. She reports that she does not use drugs.     Past Family Hx:  Marcela Mercer family history is not on file.     Problem list, medications, and allergies reviewed by myself today in Epic.          Patient presents with:  Rash: feet, lower legs and arms, itchy . Pt states rash is intermittent and extremely itchy.  Pt states she has given herself bruises from scratching. Pt denies any known changes in foods, lotions, soaps, medications.  Denies any known triggers but state she did have COVID and ever since, she has had issues with her feet being itchy and blotchy rash on feet and legs. .         Rash  This is a new problem. The current episode started 1 to 4 weeks ago. The problem has been gradually worsening since onset. Location: BLE. Pertinent negatives include no cough, fever or joint pain. Past treatments include antihistamine, anti-itch cream, moisturizer and topical steroids. The treatment provided no relief. There is no history of eczema.       Review of Systems   Constitutional: Negative for fever.   Respiratory: Negative for cough.    Musculoskeletal: Negative for joint pain.   Skin: Positive for itching and rash.   All other systems  "reviewed and are negative.             Objective     /70   Pulse 93   Temp 36.3 °C (97.4 °F) (Temporal)   Resp 16   Ht 1.651 m (5' 5\")   Wt 98.6 kg (217 lb 6.4 oz)   SpO2 97%   BMI 36.18 kg/m²      Physical Exam  Vitals and nursing note reviewed. Exam conducted with a chaperone present.   Constitutional:       Appearance: Normal appearance. She is well-developed, well-groomed and normal weight. She is not ill-appearing or toxic-appearing.   HENT:      Head: Normocephalic and atraumatic.      Right Ear: Tympanic membrane normal.      Left Ear: Tympanic membrane normal.      Nose: Nose normal.      Mouth/Throat:      Mouth: Mucous membranes are moist.      Pharynx: Oropharynx is clear.   Eyes:      Extraocular Movements: Extraocular movements intact.      Conjunctiva/sclera: Conjunctivae normal.      Pupils: Pupils are equal, round, and reactive to light.   Cardiovascular:      Rate and Rhythm: Normal rate and regular rhythm.      Pulses: Normal pulses.      Heart sounds: Normal heart sounds.   Pulmonary:      Effort: Pulmonary effort is normal.      Breath sounds: Normal breath sounds.   Abdominal:      Palpations: Abdomen is soft.   Musculoskeletal:         General: Normal range of motion.      Cervical back: Normal range of motion and neck supple.   Skin:     General: Skin is warm and dry.      Capillary Refill: Capillary refill takes less than 2 seconds.      Findings: Rash present. Rash is macular. Rash is not crusting, nodular, papular, purpuric, pustular, scaling, urticarial or vesicular.      Comments: Blotchy erythematous rash to BLE,    Neurological:      General: No focal deficit present.      Mental Status: She is alert and oriented to person, place, and time.      Gait: Gait normal.   Psychiatric:         Mood and Affect: Mood normal.         Behavior: Behavior is cooperative.                             Assessment & Plan        1. Rash and nonspecific skin eruption  hydrOXYzine HCl (ATARAX) " 25 MG Tab    predniSONE (DELTASONE) 20 MG Tab          Patient was evaluated in clinic today while wearing appropriate personal protective equipment.      PT to begin prescription medications today as discussed for itchy rash.     PT should follow up with PCP in 1-2 days for re-evaluation if symptoms have not improved.      Discussed red flags and reasons to return to UC or ED.      Pt and/or family verbalized understanding of diagnosis and follow up instructions and was offered informational handout on diagnosis.  PT discharged.

## 2022-01-06 ENCOUNTER — OFFICE VISIT (OUTPATIENT)
Dept: MEDICAL GROUP | Facility: MEDICAL CENTER | Age: 47
End: 2022-01-06
Payer: COMMERCIAL

## 2022-01-06 VITALS
TEMPERATURE: 98 F | DIASTOLIC BLOOD PRESSURE: 80 MMHG | HEART RATE: 84 BPM | OXYGEN SATURATION: 96 % | HEIGHT: 65 IN | RESPIRATION RATE: 12 BRPM | SYSTOLIC BLOOD PRESSURE: 132 MMHG | BODY MASS INDEX: 35.65 KG/M2 | WEIGHT: 214 LBS

## 2022-01-06 DIAGNOSIS — B02.9 HERPES ZOSTER WITHOUT COMPLICATION: ICD-10-CM

## 2022-01-06 DIAGNOSIS — U09.9 POST COVID-19 CONDITION, UNSPECIFIED: ICD-10-CM

## 2022-01-06 DIAGNOSIS — R21 RASH: ICD-10-CM

## 2022-01-06 PROBLEM — E66.9 OBESITY: Status: ACTIVE | Noted: 2022-01-06

## 2022-01-06 PROBLEM — J45.909 ASTHMA: Status: ACTIVE | Noted: 2022-01-06

## 2022-01-06 PROCEDURE — 99214 OFFICE O/P EST MOD 30 MIN: CPT | Performed by: PHYSICIAN ASSISTANT

## 2022-01-06 RX ORDER — VALACYCLOVIR HYDROCHLORIDE 1 G/1
1000 TABLET, FILM COATED ORAL 2 TIMES DAILY
Qty: 20 TABLET | Refills: 0 | Status: SHIPPED | OUTPATIENT
Start: 2022-01-06

## 2022-01-06 ASSESSMENT — FIBROSIS 4 INDEX: FIB4 SCORE: 1.21

## 2022-01-06 NOTE — PROGRESS NOTES
Consuelo Mercer is a 46 y.o. female who presents with Other (Rashes all over the body )          Chief Complaint   Patient presents with   • Other     Rashes all over the body        HPIPatient presents with complaint of post covid rash and new painful rash on right upper extremity.     Patient was dx with Covid 19 on 10/8/2021. She was treated with the monoclonal antibody and recovered from the acute symptoms of cough, shortness of breath, fatigue, etc. Her course was complicated by asthma. At the time of covid she also developed an itching rash on arms and legs that was very itchy. She tried otc remedies and finally went to urgent care on 12/30/21 for evaluation. The provider at urgent care thought in might be related to a vasculitis secondary to covid. She was prescribed prednisone and hydroxyzine and that rash is getting better. She is very worried about blood clots and is allergic to aspirin. About a week ago she developed a new, painful red rash on right arm. Seems to be getting worse and spreading on arm. Burning/painful. Hasn't had that problem before.    ROSno weight change. No fever/chills. No headache/dizziness. No neck or back pain. No sore throat, nasal congestion, ear pain. No chest pain, SOB or difficulty breathing. No n/v/d/c or abdominal pain. No urinary symptoms. No joint redness or swelling.        Active Ambulatory Problems     Diagnosis Date Noted   • Severe persistent asthma without complication 05/23/2018   • Psoriasis 05/23/2018   • History of sinus surgery 09/17/2019   • Nasal polyp 03/30/2021   • Asthma 01/06/2022   • Obesity 01/06/2022     Resolved Ambulatory Problems     Diagnosis Date Noted   • No Resolved Ambulatory Problems     Past Medical History:   Diagnosis Date   • Allergy    • Hypertension      Current Outpatient Medications   Medication Sig Dispense Refill   • valacyclovir (VALTREX) 1 GM Tab Take 1 Tablet by mouth 2 times a day. 20 Tablet 0   •  "fluticasone-salmeterol (ADVAIR DISKUS) 100-50 MCG/DOSE AEROSOL POWDER, BREATH ACTIVATED      • hydrOXYzine HCl (ATARAX) 25 MG Tab Take 1 Tablet by mouth 3 times a day as needed for Itching. 90 Tablet 0   • predniSONE (DELTASONE) 20 MG Tab Take 1 Tablet by mouth every day for 30 days. 30 Tablet 0   • losartan (COZAAR) 50 MG Tab Take 1 Tablet by mouth every day. 90 Tablet 1   • azithromycin (ZITHROMAX) 250 MG Tab Take two tabs by mouth on day one, then one tab by mouth daily on days 2-5. 6 Tablet 0   • fluocinonide (LIDEX) 0.05 % external solution Apply a pea size over affected area 1 mL 5   • albuterol 108 (90 Base) MCG/ACT Aero Soln inhalation aerosol INHALE 2 PUFFS BY MOUTH EVERY 6 HOURS AS NEEDED FOR SHORTNESS OF BREATH 8.5 g 11   • Fluticasone Propionate (XHANCE) 93 MCG/ACT Exhaler Suspension Administer  into affected nostril(S).     • fluticasone-salmeterol (ADVAIR) 250-50 MCG/DOSE AEROSOL POWDER, BREATH ACTIVATED Inhale 1 Puff every 12 hours. 60 Each 11   • montelukast (SINGULAIR) 10 MG Tab TAKE 1 TABLET BY MOUTH EVERY DAY 90 tablet 3   • montelukast (SINGULAIR) 10 MG Tab TAKE 1 TABLET BY MOUTH EVERY DAY 90 Tab 0   • WIXELA INHUB 500-50 MCG/DOSE AEROSOL POWDER, BREATH ACTIVATED INHALE 1 PUFF BY MOUTH TWICE DAILY 60 Each 0   • betamethasone dipropionate (DIPROLENE) 0.05 % Ointment apply a pea size over affected area once daily as needed 1 Tube 5   • cetirizine (ZYRTEC) 10 MG Tab Take 10 mg by mouth every day.       No current facility-administered medications for this visit.   allergy:Asa [aspirin], Penicillins, Sulfa drugs, and Tetracycline  Non-smoker      Objective     /80 (BP Location: Left arm, Patient Position: Sitting, BP Cuff Size: Adult)   Pulse 84   Temp 36.7 °C (98 °F) (Temporal)   Resp 12   Ht 1.651 m (5' 5\")   Wt 97.1 kg (214 lb)   SpO2 96%   BMI 35.61 kg/m²      Physical Exam  Vitals and nursing note reviewed.   Constitutional:       General: She is not in acute distress.     " Appearance: Normal appearance. She is not ill-appearing, toxic-appearing or diaphoretic.   Skin:     General: Skin is warm and dry.             Comments: Red, vesicular lesions, grouped together, consistent with shingles    Lower legs with light red flat macules with surrounding lighter skin, halo effect   Neurological:      General: No focal deficit present.      Mental Status: She is alert and oriented to person, place, and time.   Psychiatric:         Mood and Affect: Mood normal.         Behavior: Behavior normal.         Thought Content: Thought content normal.         Judgment: Judgment normal.                             Assessment & Plan        1. Herpes zoster without complication      2. Post covid-19 condition, unspecified      3. Rash       valacyclovir prescribed although as these symptoms started a week ago it may not be helpful. Cont current meds. F/u with Tiffany in 1-2 weeks.

## 2022-01-09 ASSESSMENT — ENCOUNTER SYMPTOMS
FEVER: 0
COUGH: 0

## 2022-02-16 DIAGNOSIS — Z11.1 TUBERCULOSIS SCREENING: ICD-10-CM

## 2022-02-16 DIAGNOSIS — J45.50 SEVERE PERSISTENT ASTHMA WITHOUT COMPLICATION: ICD-10-CM

## 2022-05-08 DIAGNOSIS — J45.50 SEVERE PERSISTENT ASTHMA WITHOUT COMPLICATION: ICD-10-CM

## 2022-05-10 RX ORDER — MONTELUKAST SODIUM 10 MG/1
TABLET ORAL
Qty: 90 TABLET | Refills: 3 | Status: SHIPPED | OUTPATIENT
Start: 2022-05-10
